# Patient Record
Sex: FEMALE | Race: WHITE | NOT HISPANIC OR LATINO
[De-identification: names, ages, dates, MRNs, and addresses within clinical notes are randomized per-mention and may not be internally consistent; named-entity substitution may affect disease eponyms.]

---

## 2017-04-04 ENCOUNTER — APPOINTMENT (OUTPATIENT)
Dept: NEUROLOGY | Facility: CLINIC | Age: 42
End: 2017-04-04

## 2017-04-04 VITALS
HEART RATE: 66 BPM | HEIGHT: 67 IN | WEIGHT: 150 LBS | DIASTOLIC BLOOD PRESSURE: 60 MMHG | SYSTOLIC BLOOD PRESSURE: 95 MMHG | BODY MASS INDEX: 23.54 KG/M2

## 2018-01-05 ENCOUNTER — MEDICATION RENEWAL (OUTPATIENT)
Age: 43
End: 2018-01-05

## 2018-04-04 ENCOUNTER — APPOINTMENT (OUTPATIENT)
Dept: NEUROLOGY | Facility: CLINIC | Age: 43
End: 2018-04-04

## 2018-05-09 ENCOUNTER — APPOINTMENT (OUTPATIENT)
Dept: NEUROLOGY | Facility: CLINIC | Age: 43
End: 2018-05-09
Payer: COMMERCIAL

## 2018-05-09 VITALS
BODY MASS INDEX: 23.86 KG/M2 | HEART RATE: 82 BPM | HEIGHT: 67 IN | WEIGHT: 152 LBS | DIASTOLIC BLOOD PRESSURE: 72 MMHG | SYSTOLIC BLOOD PRESSURE: 107 MMHG

## 2018-05-09 PROCEDURE — 99214 OFFICE O/P EST MOD 30 MIN: CPT

## 2018-05-24 ENCOUNTER — MEDICATION RENEWAL (OUTPATIENT)
Age: 43
End: 2018-05-24

## 2019-05-15 ENCOUNTER — APPOINTMENT (OUTPATIENT)
Dept: NEUROLOGY | Facility: CLINIC | Age: 44
End: 2019-05-15
Payer: COMMERCIAL

## 2019-05-15 VITALS
DIASTOLIC BLOOD PRESSURE: 64 MMHG | HEIGHT: 67 IN | HEART RATE: 73 BPM | WEIGHT: 155 LBS | BODY MASS INDEX: 24.33 KG/M2 | SYSTOLIC BLOOD PRESSURE: 94 MMHG

## 2019-05-15 DIAGNOSIS — R63.5 ABNORMAL WEIGHT GAIN: ICD-10-CM

## 2019-05-15 DIAGNOSIS — G47.52 REM SLEEP BEHAVIOR DISORDER: ICD-10-CM

## 2019-05-15 PROCEDURE — 99214 OFFICE O/P EST MOD 30 MIN: CPT

## 2019-05-15 NOTE — PHYSICAL EXAM
[General Appearance - Alert] : alert [General Appearance - In No Acute Distress] : in no acute distress [Oriented To Time, Place, And Person] : oriented to person, place, and time [Affect] : the affect was normal [Impaired Insight] : insight and judgment were intact [Person] : oriented to person [Place] : oriented to place [Time] : oriented to time [Concentration Intact] : normal concentrating ability [Visual Intact] : visual attention was ~T not ~L decreased [Naming Objects] : no difficulty naming common objects [Repeating Phrases] : no difficulty repeating a phrase [Fluency] : fluency intact [Comprehension] : comprehension intact [Writing A Sentence] : no difficulty writing a sentence [Past History] : adequate knowledge of personal past history [Reading] : reading intact [Cranial Nerves Facial (VII)] : face symmetrical [Cranial Nerves Oculomotor (III)] : extraocular motion intact [Cranial Nerves Trigeminal (V)] : facial sensation intact symmetrically [Cranial Nerves Glossopharyngeal (IX)] : tongue and palate midline [Cranial Nerves Hypoglossal (XII)] : there was no tongue deviation with protrusion [Cranial Nerves Vestibulocochlear (VIII)] : hearing was intact bilaterally [Cranial Nerves Accessory (XI - Cranial And Spinal)] : head turning and shoulder shrug symmetric [Motor Tone] : muscle tone was normal in all four extremities [Motor Strength] : muscle strength was normal in all four extremities [No Muscle Atrophy] : normal bulk in all four extremities [Motor Handedness Right-Handed] : the patient is right hand dominant [Sensation Tactile Decrease] : light touch was intact [Abnormal Walk] : normal gait [Balance] : balance was intact [2+] : Ankle jerk left 2+ [Sclera] : the sclera and conjunctiva were normal [PERRL With Normal Accommodation] : pupils were equal in size, round, reactive to light, with normal accommodation [Extraocular Movements] : extraocular movements were intact [Outer Ear] : the ears and nose were normal in appearance [Oropharynx] : the oropharynx was normal [Neck Cervical Mass (___cm)] : no neck mass was observed [Neck Appearance] : the appearance of the neck was normal [Jugular Venous Distention Increased] : there was no jugular-venous distention [Thyroid Nodule] : there were no palpable thyroid nodules [Thyroid Diffuse Enlargement] : the thyroid was not enlarged [Apical Impulse] : the apical impulse was normal [Heart Rate And Rhythm] : heart rate was normal and rhythm regular [Nail Clubbing] : no clubbing  or cyanosis of the fingernails [Skin Lesions] : no skin lesions [] : no rash [Past-pointing] : there was no past-pointing [Tremor] : no tremor present [Plantar Reflex Right Only] : normal on the right [Plantar Reflex Left Only] : normal on the left [FreeTextEntry4] : gregarious, very pleasant [FreeTextEntry5] : RLQ VF defect

## 2019-05-15 NOTE — REVIEW OF SYSTEMS
[Recent Weight Gain (___ Lbs)] : recent [unfilled] ~Ulb weight gain [As Noted in HPI] : as noted in HPI [Sleep Disturbances] : sleep disturbances [Anxiety] : anxiety [Negative] : Heme/Lymph
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
## 2019-05-15 NOTE — HISTORY OF PRESENT ILLNESS
[Right Handed] : right handed [Family History of Seizures] : family history of seizures [Head Trauma] : head trauma [Divalproex (Depakote)] : divalproex (Depakote) [Grand Mal Status Epilepticus] : no [Lesional] : nonlesional [] : no [ Complications] : ~T no  complications [Developmental Delay] : no developmental delay [Meningitis or Encephalitis] : no meningitis or encephalitis [Stroke] : no stroke  [FreeTextEntry3] : Age 3-4 staring spells.  Went to ER once at that time, unrevealing.\par As child, may have had intermittent staring spells a few times a year.\par Around age 15 starting getting jerks when sleep deprived, might drop objects. Some confusion and staring spells  to 2-3 min.  Happening nearly daily at one point.  One day upon arousal, had episode of frequent twitching, and drops, fell down stairs.  Saw neurologist at that time at [facility], then Dr Paco Zepeda at [facility].  Was placed on VPA for years with very good control. \par No GTCS ever. [FreeTextEntry1] : staring spells since age 3-5yo [de-identified] : hit head around age 3, pool table fell on her. GMo with GTC.  MCou with absence.

## 2019-05-15 NOTE — CONSULT LETTER
[Consult Letter:] : I had the pleasure of evaluating your patient, [unfilled]. [Dear  ___] : Dear  [unfilled], [Please see my note below.] : Please see my note below. [( Thank you for referring [unfilled] for consultation for _____ )] : Thank you for referring [unfilled] for consultation for [unfilled] [Sincerely,] : Sincerely, [Consult Closing:] : Thank you very much for allowing me to participate in the care of this patient.  If you have any questions, please do not hesitate to contact me. [DrSary  ___] : Dr. SHAW [Yosvany Griffiths MD] : Yosvany Griffiths MD [[facility]] : [facility] [Attending, Dept. of Neurology, Division of Epilepsy] : Attending, Dept. of Neurology, Division of Epilepsy [ of Neurology] :  of Neurology [FreeTextEntry2] : Yolande Lee MD

## 2019-05-15 NOTE — DATA REVIEWED
[de-identified] : Could not tolerated open MRI for VF defect (not done).\par  [de-identified] : done remotely [de-identified] : 2016 [facility] nl,

## 2019-09-10 ENCOUNTER — APPOINTMENT (OUTPATIENT)
Dept: FAMILY MEDICINE | Facility: CLINIC | Age: 44
End: 2019-09-10

## 2019-09-17 ENCOUNTER — APPOINTMENT (OUTPATIENT)
Dept: FAMILY MEDICINE | Facility: CLINIC | Age: 44
End: 2019-09-17
Payer: COMMERCIAL

## 2019-09-17 VITALS
WEIGHT: 152 LBS | SYSTOLIC BLOOD PRESSURE: 110 MMHG | DIASTOLIC BLOOD PRESSURE: 62 MMHG | HEIGHT: 67 IN | BODY MASS INDEX: 23.86 KG/M2

## 2019-09-17 DIAGNOSIS — Z82.49 FAMILY HISTORY OF ISCHEMIC HEART DISEASE AND OTHER DISEASES OF THE CIRCULATORY SYSTEM: ICD-10-CM

## 2019-09-17 DIAGNOSIS — Z83.3 FAMILY HISTORY OF DIABETES MELLITUS: ICD-10-CM

## 2019-09-17 DIAGNOSIS — Z87.898 PERSONAL HISTORY OF OTHER SPECIFIED CONDITIONS: ICD-10-CM

## 2019-09-17 DIAGNOSIS — Z86.39 PERSONAL HISTORY OF OTHER ENDOCRINE, NUTRITIONAL AND METABOLIC DISEASE: ICD-10-CM

## 2019-09-17 DIAGNOSIS — Z87.2 PERSONAL HISTORY OF DISEASES OF THE SKIN AND SUBCUTANEOUS TISSUE: ICD-10-CM

## 2019-09-17 DIAGNOSIS — Z83.438 FAMILY HISTORY OF OTHER DISORDER OF LIPOPROTEIN METABOLISM AND OTHER LIPIDEMIA: ICD-10-CM

## 2019-09-17 DIAGNOSIS — Z87.828 PERSONAL HISTORY OF OTHER (HEALED) PHYSICAL INJURY AND TRAUMA: ICD-10-CM

## 2019-09-17 PROCEDURE — 99204 OFFICE O/P NEW MOD 45 MIN: CPT

## 2019-09-19 PROBLEM — Z86.39 HISTORY OF OBESITY: Status: RESOLVED | Noted: 2019-05-15 | Resolved: 2019-09-19

## 2019-09-19 PROBLEM — Z82.49 FAMILY HISTORY OF MYOCARDIAL INFARCTION: Status: ACTIVE | Noted: 2019-09-19

## 2019-09-19 PROBLEM — Z83.3 FAMILY HISTORY OF DIABETES MELLITUS: Status: ACTIVE | Noted: 2019-09-19

## 2019-09-19 PROBLEM — Z87.828 HISTORY OF MOTOR VEHICLE ACCIDENT: Status: RESOLVED | Noted: 2019-09-19 | Resolved: 2019-09-19

## 2019-09-19 PROBLEM — Z83.438 FAMILY HISTORY OF HYPERLIPIDEMIA: Status: ACTIVE | Noted: 2019-09-19

## 2019-09-19 NOTE — PHYSICAL EXAM
[No Lymphadenopathy] : no lymphadenopathy [Supple] : supple [Pedal Pulses Present] : the pedal pulses are present [No Extremity Clubbing/Cyanosis] : no extremity clubbing/cyanosis [No Edema] : there was no peripheral edema [No Rash] : no rash [Coordination Grossly Intact] : coordination grossly intact [Normal Gait] : normal gait [Normal] : affect was normal and insight and judgment were intact

## 2019-09-19 NOTE — HISTORY OF PRESENT ILLNESS
[FreeTextEntry8] : Larissa is a 44-year old female who presents as a new patient to establish care. Would like to return to complete a full physical exam.\par HLD - labs from 5/19 shows elevated lipids panel, wants to retest when fasting.\par Leukocytosis - evaluated by heme\par Insomnia - no trouble falling asleep, trouble staying asleep.

## 2019-09-25 ENCOUNTER — APPOINTMENT (OUTPATIENT)
Dept: FAMILY MEDICINE | Facility: CLINIC | Age: 44
End: 2019-09-25
Payer: COMMERCIAL

## 2019-09-25 ENCOUNTER — NON-APPOINTMENT (OUTPATIENT)
Age: 44
End: 2019-09-25

## 2019-09-25 VITALS
HEIGHT: 67 IN | SYSTOLIC BLOOD PRESSURE: 108 MMHG | BODY MASS INDEX: 23.7 KG/M2 | DIASTOLIC BLOOD PRESSURE: 74 MMHG | WEIGHT: 151 LBS

## 2019-09-25 DIAGNOSIS — Z12.31 ENCOUNTER FOR SCREENING MAMMOGRAM FOR MALIGNANT NEOPLASM OF BREAST: ICD-10-CM

## 2019-09-25 PROCEDURE — 99396 PREV VISIT EST AGE 40-64: CPT | Mod: 25

## 2019-09-25 PROCEDURE — 36415 COLL VENOUS BLD VENIPUNCTURE: CPT

## 2019-09-25 PROCEDURE — 81003 URINALYSIS AUTO W/O SCOPE: CPT | Mod: QW

## 2019-09-25 PROCEDURE — 93000 ELECTROCARDIOGRAM COMPLETE: CPT

## 2019-09-25 NOTE — HEALTH RISK ASSESSMENT
[Good] : ~his/her~  mood as  good [Change in mental status noted] : No change in mental status noted [Language] : denies difficulty with language [Learning/Retaining New Information] : denies difficulty learning/retaining new information [Behavior] : denies difficulty with behavior [None] : None [Fully functional (using the telephone, shopping, preparing meals, housekeeping, doing laundry, using] : Fully functional and needs no help or supervision to perform IADLs (using the telephone, shopping, preparing meals, housekeeping, doing laundry, using transportation, managing medications and managing finances) [Fully functional (bathing, dressing, toileting, transferring, walking, feeding)] : Fully functional (bathing, dressing, toileting, transferring, walking, feeding) [Reports changes in vision] : Reports changes in vision [Reports changes in hearing] : Reports no changes in hearing [de-identified] : vision changes - floaters since headache [FreeTextEntry2] :

## 2019-09-25 NOTE — PHYSICAL EXAM
[Normal TMs] : both tympanic membranes were normal [No Lymphadenopathy] : no lymphadenopathy [Thyroid Normal, No Nodules] : the thyroid was normal and there were no nodules present [Supple] : supple [Pedal Pulses Present] : the pedal pulses are present [No Edema] : there was no peripheral edema [Normal] : no joint swelling and grossly normal strength and tone [No Rash] : no rash [Coordination Grossly Intact] : coordination grossly intact [Normal Gait] : normal gait [Alert and Oriented x3] : oriented to person, place, and time [Normal Affect] : the affect was normal [de-identified] : + clubbing b/l hands

## 2019-09-25 NOTE — ASSESSMENT
[FreeTextEntry1] : All urine and blood specimens collected in office today.\par RTO in 1 week to review labs and for PNA vaccine.\par \par Will return for PNA vaccine, does not want flu vaccine.

## 2019-09-25 NOTE — HISTORY OF PRESENT ILLNESS
[FreeTextEntry1] : patient is here for a physical.  [de-identified] : Larissa is a 44-year old female who presents for s a physical exam.\par Reports that she had a headache last week - but is not resolved, was seen by an opthalmology who recommended follow up with neuro and MRI. Denies and residual pain at this time.\par Reports left shoulder pain - ongoing, wants referral to orthopedic surgeon.\par Has already reduced smoking in half - smokes about 10 cig/day.\par

## 2019-09-26 LAB
ALBUMIN SERPL ELPH-MCNC: 4.3 G/DL
ALP BLD-CCNC: 83 U/L
ALT SERPL-CCNC: 10 U/L
ANION GAP SERPL CALC-SCNC: 13 MMOL/L
AST SERPL-CCNC: 17 U/L
BASOPHILS # BLD AUTO: 0.04 K/UL
BASOPHILS NFR BLD AUTO: 0.5 %
BILIRUB SERPL-MCNC: 0.3 MG/DL
BUN SERPL-MCNC: 6 MG/DL
CALCIUM SERPL-MCNC: 9.7 MG/DL
CHLORIDE SERPL-SCNC: 98 MMOL/L
CHOLEST SERPL-MCNC: 265 MG/DL
CHOLEST/HDLC SERPL: 8 RATIO
CO2 SERPL-SCNC: 26 MMOL/L
CREAT SERPL-MCNC: 0.65 MG/DL
EOSINOPHIL # BLD AUTO: 0.23 K/UL
EOSINOPHIL NFR BLD AUTO: 2.7 %
GLUCOSE SERPL-MCNC: 93 MG/DL
HCT VFR BLD CALC: 40.6 %
HDLC SERPL-MCNC: 33 MG/DL
HGB BLD-MCNC: 13.1 G/DL
IMM GRANULOCYTES NFR BLD AUTO: 0.2 %
LDLC SERPL CALC-MCNC: 177 MG/DL
LYMPHOCYTES # BLD AUTO: 4.71 K/UL
LYMPHOCYTES NFR BLD AUTO: 56.1 %
MAN DIFF?: NORMAL
MCHC RBC-ENTMCNC: 29 PG
MCHC RBC-ENTMCNC: 32.3 GM/DL
MCV RBC AUTO: 89.8 FL
MONOCYTES # BLD AUTO: 0.49 K/UL
MONOCYTES NFR BLD AUTO: 5.8 %
NEUTROPHILS # BLD AUTO: 2.91 K/UL
NEUTROPHILS NFR BLD AUTO: 34.7 %
PLATELET # BLD AUTO: 245 K/UL
POTASSIUM SERPL-SCNC: 4.1 MMOL/L
PROT SERPL-MCNC: 6.9 G/DL
RBC # BLD: 4.52 M/UL
RBC # FLD: 13.4 %
SODIUM SERPL-SCNC: 137 MMOL/L
TRIGL SERPL-MCNC: 276 MG/DL
TSH SERPL-ACNC: 1.07 UIU/ML
WBC # FLD AUTO: 8.4 K/UL

## 2019-10-02 ENCOUNTER — APPOINTMENT (OUTPATIENT)
Dept: FAMILY MEDICINE | Facility: CLINIC | Age: 44
End: 2019-10-02
Payer: COMMERCIAL

## 2019-10-02 DIAGNOSIS — Z23 ENCOUNTER FOR IMMUNIZATION: ICD-10-CM

## 2019-10-02 PROCEDURE — G0009: CPT

## 2019-10-02 PROCEDURE — 90732 PPSV23 VACC 2 YRS+ SUBQ/IM: CPT

## 2019-10-03 LAB
CHOLEST SERPL-MCNC: 267 MG/DL
CHOLEST/HDLC SERPL: 7.2 RATIO
HDLC SERPL-MCNC: 37 MG/DL
LDLC SERPL CALC-MCNC: 172 MG/DL
TRIGL SERPL-MCNC: 292 MG/DL

## 2020-01-13 ENCOUNTER — APPOINTMENT (OUTPATIENT)
Dept: FAMILY MEDICINE | Facility: CLINIC | Age: 45
End: 2020-01-13
Payer: COMMERCIAL

## 2020-01-13 ENCOUNTER — LABORATORY RESULT (OUTPATIENT)
Age: 45
End: 2020-01-13

## 2020-01-13 VITALS
OXYGEN SATURATION: 99 % | BODY MASS INDEX: 23.54 KG/M2 | SYSTOLIC BLOOD PRESSURE: 104 MMHG | TEMPERATURE: 98.2 F | HEART RATE: 70 BPM | HEIGHT: 67 IN | DIASTOLIC BLOOD PRESSURE: 70 MMHG | WEIGHT: 150 LBS

## 2020-01-13 DIAGNOSIS — R06.2 WHEEZING: ICD-10-CM

## 2020-01-13 PROCEDURE — 36415 COLL VENOUS BLD VENIPUNCTURE: CPT

## 2020-01-13 PROCEDURE — 99214 OFFICE O/P EST MOD 30 MIN: CPT | Mod: 25

## 2020-01-13 NOTE — PHYSICAL EXAM
[Normal Sclera/Conjunctiva] : normal sclera/conjunctiva [Normal Outer Ear/Nose] : the outer ears and nose were normal in appearance [No Respiratory Distress] : no respiratory distress  [No Accessory Muscle Use] : no accessory muscle use [No Edema] : there was no peripheral edema [Non-distended] : non-distended [No CVA Tenderness] : no CVA  tenderness [No Joint Swelling] : no joint swelling [No Rash] : no rash [Coordination Grossly Intact] : coordination grossly intact [Normal] : affect was normal and insight and judgment were intact [de-identified] : + clubbing

## 2020-01-13 NOTE — HISTORY OF PRESENT ILLNESS
[FreeTextEntry1] : Patient is here for a follow up and medication renewal at [facility] in [place]  [de-identified] : Larissa is a 44-year old female who presents for a follow up.\par Leukocytosis - h/o leukocytosis, needs a referral for hem/onc Dr. Méndez\par Herpes simplex - reports oral and genital lesions\par HLD - reports that she has not seen the nutritionist yet, and had has not adjusted her diet\par Wheeze - reports that she starts wheezing when she exercises; still smoking\par

## 2020-01-14 LAB
ALBUMIN SERPL ELPH-MCNC: 4.6 G/DL
ALP BLD-CCNC: 82 U/L
ALT SERPL-CCNC: 8 U/L
ANION GAP SERPL CALC-SCNC: 14 MMOL/L
AST SERPL-CCNC: 18 U/L
BASOPHILS # BLD AUTO: 0.04 K/UL
BASOPHILS NFR BLD AUTO: 0.4 %
BILIRUB SERPL-MCNC: 0.2 MG/DL
BUN SERPL-MCNC: 7 MG/DL
CALCIUM SERPL-MCNC: 9.1 MG/DL
CHLORIDE SERPL-SCNC: 100 MMOL/L
CHOLEST SERPL-MCNC: 229 MG/DL
CHOLEST/HDLC SERPL: 7.6 RATIO
CO2 SERPL-SCNC: 23 MMOL/L
CREAT SERPL-MCNC: 0.66 MG/DL
EOSINOPHIL # BLD AUTO: 0.26 K/UL
EOSINOPHIL NFR BLD AUTO: 2.5 %
GLUCOSE SERPL-MCNC: 86 MG/DL
HCT VFR BLD CALC: 41.6 %
HDLC SERPL-MCNC: 30 MG/DL
HGB BLD-MCNC: 13.1 G/DL
IMM GRANULOCYTES NFR BLD AUTO: 0.6 %
LDLC SERPL CALC-MCNC: 139 MG/DL
LYMPHOCYTES # BLD AUTO: 5.16 K/UL
LYMPHOCYTES NFR BLD AUTO: 48.9 %
MAN DIFF?: NORMAL
MCHC RBC-ENTMCNC: 28.7 PG
MCHC RBC-ENTMCNC: 31.5 GM/DL
MCV RBC AUTO: 91.2 FL
MONOCYTES # BLD AUTO: 0.6 K/UL
MONOCYTES NFR BLD AUTO: 5.7 %
NEUTROPHILS # BLD AUTO: 4.43 K/UL
NEUTROPHILS NFR BLD AUTO: 41.9 %
PLATELET # BLD AUTO: 339 K/UL
POTASSIUM SERPL-SCNC: 4.5 MMOL/L
PROT SERPL-MCNC: 7.3 G/DL
RBC # BLD: 4.56 M/UL
RBC # FLD: 13.3 %
SODIUM SERPL-SCNC: 137 MMOL/L
TRIGL SERPL-MCNC: 301 MG/DL
WBC # FLD AUTO: 10.55 K/UL

## 2020-03-13 ENCOUNTER — APPOINTMENT (OUTPATIENT)
Dept: PULMONOLOGY | Facility: CLINIC | Age: 45
End: 2020-03-13

## 2020-04-29 ENCOUNTER — APPOINTMENT (OUTPATIENT)
Dept: NEUROLOGY | Facility: CLINIC | Age: 45
End: 2020-04-29
Payer: COMMERCIAL

## 2020-04-29 ENCOUNTER — RX RENEWAL (OUTPATIENT)
Age: 45
End: 2020-04-29

## 2020-04-29 DIAGNOSIS — G47.9 SLEEP DISORDER, UNSPECIFIED: ICD-10-CM

## 2020-04-29 DIAGNOSIS — G47.00 INSOMNIA, UNSPECIFIED: ICD-10-CM

## 2020-04-29 PROCEDURE — 99213 OFFICE O/P EST LOW 20 MIN: CPT | Mod: 95
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
## 2020-04-29 NOTE — DATA REVIEWED
[de-identified] : Could not tolerated open MRI for VF defect (not done).\par  [de-identified] : done remotely [de-identified] : 2016 [facility] nl,

## 2020-04-29 NOTE — CONSULT LETTER
[Dear  ___] : Dear  [unfilled], [Consult Letter:] : I had the pleasure of evaluating your patient, [unfilled]. [( Thank you for referring [unfilled] for consultation for _____ )] : Thank you for referring [unfilled] for consultation for [unfilled] [Sincerely,] : Sincerely, [Please see my note below.] : Please see my note below. [Consult Closing:] : Thank you very much for allowing me to participate in the care of this patient.  If you have any questions, please do not hesitate to contact me. [DrSary  ___] : Dr. SHAW [Attending, Dept. of Neurology, Division of Epilepsy] : Attending, Dept. of Neurology, Division of Epilepsy [Yosvany Griffiths MD] : Yosvany Griffiths MD [[facility]] : [facility] [ of Neurology] :  of Neurology [FreeTextEntry2] : Yolande Lee MD

## 2020-04-29 NOTE — PHYSICAL EXAM
[FreeTextEntry1] : Exam limited via telehealth:\par MS:  awake, alert, coherent, fluent, comprehension intake, affect stable.  oriented\par CN: EOMI, face symmetrical, grimace, smile symmetrical, eye closure symmetrical, hearing intact grossly.\par Motor: moving all 4 extremities freely.\par Coord: FFM and screen to nose intact symmetrically.\par Walking / Sensation deferred.\par Cardiac/pulmonary/extremity exam deferred via telehealth.\par

## 2020-04-29 NOTE — HISTORY OF PRESENT ILLNESS
[Right Handed] : right handed [Family History of Seizures] : family history of seizures [Head Trauma] : head trauma [Divalproex (Depakote)] : divalproex (Depakote) [Lesional] : nonlesional [] : no [Grand Mal Status Epilepticus] : no [ Complications] : ~T no  complications [Developmental Delay] : no developmental delay [Meningitis or Encephalitis] : no meningitis or encephalitis [Stroke] : no stroke  [FreeTextEntry1] : staring spells since age 3-5yo [FreeTextEntry3] : Age 3-4 staring spells.  Went to ER once at that time, unrevealing.\par As child, may have had intermittent staring spells a few times a year.\par Around age 15 starting getting jerks when sleep deprived, might drop objects. Some confusion and staring spells  to 2-3 min.  Happening nearly daily at one point.  One day upon arousal, had episode of frequent twitching, and drops, fell down stairs.  Saw neurologist at that time at [facility], then Dr Paco Zepeda at [facility].  Was placed on VPA for years with very good control. \par No GTCS ever. [de-identified] : hit head around age 3, pool table fell on her. GMo with GTC.  MCou with absence.

## 2020-05-18 ENCOUNTER — APPOINTMENT (OUTPATIENT)
Dept: PULMONOLOGY | Facility: CLINIC | Age: 45
End: 2020-05-18

## 2020-05-19 ENCOUNTER — APPOINTMENT (OUTPATIENT)
Dept: FAMILY MEDICINE | Facility: CLINIC | Age: 45
End: 2020-05-19
Payer: COMMERCIAL

## 2020-05-19 VITALS
HEART RATE: 72 BPM | DIASTOLIC BLOOD PRESSURE: 76 MMHG | WEIGHT: 155.38 LBS | TEMPERATURE: 97.9 F | OXYGEN SATURATION: 98 % | HEIGHT: 66.5 IN | SYSTOLIC BLOOD PRESSURE: 124 MMHG | BODY MASS INDEX: 24.68 KG/M2

## 2020-05-19 DIAGNOSIS — B00.9 HERPESVIRAL INFECTION, UNSPECIFIED: ICD-10-CM

## 2020-05-19 PROCEDURE — 99204 OFFICE O/P NEW MOD 45 MIN: CPT

## 2020-05-19 NOTE — HISTORY OF PRESENT ILLNESS
[FreeTextEntry1] : establish care, mouth sores [de-identified] : 44 year old female with pmhx of epilepsy, anxiety, insomnia, HSV presents for increased HSV outbreaks. For past 6 months has been getting sores and outbreaks daily. Also suffers from poor dentition and believes she may have gum disease. She states she gets outbreaks in her throat, sinuses and nasal cavity. States if she misses dose of valtrex the sores return immediately but when on medication they subside. Does admit to increased stress recently due to pandemic but states sx started in January prior to it.

## 2020-05-19 NOTE — PHYSICAL EXAM
[Well-Appearing] : well-appearing [No Acute Distress] : no acute distress [Normal Sclera/Conjunctiva] : normal sclera/conjunctiva [PERRL] : pupils equal round and reactive to light [Normal Outer Ear/Nose] : the outer ears and nose were normal in appearance [No Respiratory Distress] : no respiratory distress  [No Accessory Muscle Use] : no accessory muscle use [Clear to Auscultation] : lungs were clear to auscultation bilaterally [Normal Rate] : normal rate  [Regular Rhythm] : with a regular rhythm [Normal S1, S2] : normal S1 and S2 [Soft] : abdomen soft [Non Tender] : non-tender [Non-distended] : non-distended [Normal Bowel Sounds] : normal bowel sounds [No Focal Deficits] : no focal deficits [Normal Affect] : the affect was normal [Normal Insight/Judgement] : insight and judgment were intact [de-identified] : + lesions on posterior oropharynx noted

## 2020-05-19 NOTE — REVIEW OF SYSTEMS
[Postnasal Drip] : postnasal drip [Fever] : no fever [Chills] : no chills [Fatigue] : no fatigue [Discharge] : no discharge [Vision Problems] : no vision problems [Earache] : no earache [Nasal Discharge] : no nasal discharge [Sore Throat] : no sore throat [Chest Pain] : no chest pain [Palpitations] : no palpitations [Shortness Of Breath] : no shortness of breath [Wheezing] : no wheezing [Cough] : no cough [Abdominal Pain] : no abdominal pain [Nausea] : no nausea [Diarrhea] : diarrhea [Vomiting] : no vomiting [Headache] : no headache [Dizziness] : no dizziness [Anxiety] : no anxiety [Depression] : no depression [FreeTextEntry4] : + throat and nasal sores [de-identified] : +

## 2020-05-19 NOTE — PLAN
[FreeTextEntry1] : HLD\par - mildly elevated\par - pt has tried crestor in past which caused itchiness\par - encouraged lifestyle modifications \par \par Nicotine abuse\par - counseled on importance of cessation\par \par HSV infx\par - start valtrex daily\par - f/u with ENT and periodontist \par \par Epilepsy\par - cont neuro f/u and management \par \par Elevated TG\par - cont to monitor\par - lifestyle modifications \par \par f/u in 6 months or PRN

## 2020-06-04 ENCOUNTER — APPOINTMENT (OUTPATIENT)
Dept: OTOLARYNGOLOGY | Facility: CLINIC | Age: 45
End: 2020-06-04

## 2020-07-20 ENCOUNTER — APPOINTMENT (OUTPATIENT)
Dept: FAMILY MEDICINE | Facility: CLINIC | Age: 45
End: 2020-07-20

## 2020-08-03 ENCOUNTER — APPOINTMENT (OUTPATIENT)
Dept: ULTRASOUND IMAGING | Facility: CLINIC | Age: 45
End: 2020-08-03

## 2020-08-03 ENCOUNTER — APPOINTMENT (OUTPATIENT)
Dept: MAMMOGRAPHY | Facility: CLINIC | Age: 45
End: 2020-08-03

## 2020-10-10 ENCOUNTER — APPOINTMENT (OUTPATIENT)
Dept: FAMILY MEDICINE | Facility: CLINIC | Age: 45
End: 2020-10-10
Payer: COMMERCIAL

## 2020-10-10 VITALS
TEMPERATURE: 98 F | DIASTOLIC BLOOD PRESSURE: 70 MMHG | HEART RATE: 101 BPM | WEIGHT: 150.5 LBS | SYSTOLIC BLOOD PRESSURE: 118 MMHG | BODY MASS INDEX: 24.19 KG/M2 | OXYGEN SATURATION: 99 % | HEIGHT: 66 IN

## 2020-10-10 DIAGNOSIS — Z12.39 ENCOUNTER FOR OTHER SCREENING FOR MALIGNANT NEOPLASM OF BREAST: ICD-10-CM

## 2020-10-10 DIAGNOSIS — D72.829 ELEVATED WHITE BLOOD CELL COUNT, UNSPECIFIED: ICD-10-CM

## 2020-10-10 PROCEDURE — 99396 PREV VISIT EST AGE 40-64: CPT | Mod: 25

## 2020-10-10 PROCEDURE — 99214 OFFICE O/P EST MOD 30 MIN: CPT | Mod: 25

## 2020-10-10 PROCEDURE — 96127 BRIEF EMOTIONAL/BEHAV ASSMT: CPT

## 2020-10-10 NOTE — PHYSICAL EXAM
[No Acute Distress] : no acute distress [Well-Appearing] : well-appearing [Normal Sclera/Conjunctiva] : normal sclera/conjunctiva [PERRL] : pupils equal round and reactive to light [Normal Outer Ear/Nose] : the outer ears and nose were normal in appearance [Normal TMs] : both tympanic membranes were normal [No Respiratory Distress] : no respiratory distress  [No Accessory Muscle Use] : no accessory muscle use [Clear to Auscultation] : lungs were clear to auscultation bilaterally [Normal Rate] : normal rate  [Regular Rhythm] : with a regular rhythm [Soft] : abdomen soft [Non Tender] : non-tender [Non-distended] : non-distended [Normal Bowel Sounds] : normal bowel sounds [No Joint Swelling] : no joint swelling [Grossly Normal Strength/Tone] : grossly normal strength/tone [No Rash] : no rash [No Focal Deficits] : no focal deficits [Normal Gait] : normal gait [Normal Affect] : the affect was normal [Normal Insight/Judgement] : insight and judgment were intact [de-identified] : CN II-XII grossly intact

## 2020-10-10 NOTE — REVIEW OF SYSTEMS
[Fever] : no fever [Chills] : no chills [Fatigue] : no fatigue [Discharge] : no discharge [Vision Problems] : no vision problems [Earache] : no earache [Nasal Discharge] : no nasal discharge [Sore Throat] : no sore throat [Chest Pain] : no chest pain [Palpitations] : no palpitations [Shortness Of Breath] : no shortness of breath [Wheezing] : no wheezing [Cough] : no cough [Abdominal Pain] : no abdominal pain [Nausea] : no nausea [Diarrhea] : diarrhea [Vomiting] : no vomiting [Dysuria] : no dysuria [Hematuria] : no hematuria [Joint Pain] : joint pain [Joint Stiffness] : joint stiffness [Muscle Pain] : muscle pain [Back Pain] : back pain [Itching] : no itching [Skin Rash] : no skin rash [Headache] : no headache [Dizziness] : no dizziness [Anxiety] : anxiety [Depression] : depression

## 2020-10-10 NOTE — HISTORY OF PRESENT ILLNESS
[FreeTextEntry1] : CPE, concerns of numbness and tingling. left shoulder pain [de-identified] : 45 year old female presents for CPE. She is extremely anxious and concerned about some numbness, tingling and burning she is experiencing. States it started a few weeks ago when she was sitting on the floor. Felt pulsating in her leg. Now has numbness and tingling in both feet, both hands and in her head. \par She admits to smoking and states she cannot quit now. \par She also complains of left shoulder pain. Denies known precipitating events.\par  No recent mammo. Went to [facility] in July and they had to reschedule her so she was annoyed and did not f/u again.\par  She has a hx of epilepsy and is followed by neurology

## 2020-10-10 NOTE — PLAN
[FreeTextEntry1] : Paresthesias \par - f/u labs\par - f/u with neuro \par - trial of meloxicam, can consider gabapentin but would like neuro eval first\par \par Left shoulder pain, joint stiffness\par - f/u labs\par - consider ortho eval\par - meloxicam\par - would benefit from PT\par \par epilepsy\par - neuro f/u\par \par anxiety/depression\par - cont current meds\par - worse now due to her current health issues \par \par HCM\par - rx for mammo\par - declines flu vaccine today

## 2020-10-12 LAB
25(OH)D3 SERPL-MCNC: 30.6 NG/ML
ALBUMIN SERPL ELPH-MCNC: 4.6 G/DL
ALP BLD-CCNC: 77 U/L
ALT SERPL-CCNC: 10 U/L
ANION GAP SERPL CALC-SCNC: 15 MMOL/L
AST SERPL-CCNC: 21 U/L
BASOPHILS # BLD AUTO: 0.05 K/UL
BASOPHILS NFR BLD AUTO: 0.6 %
BILIRUB SERPL-MCNC: 0.3 MG/DL
BUN SERPL-MCNC: 6 MG/DL
CALCIUM SERPL-MCNC: 9.8 MG/DL
CHLORIDE SERPL-SCNC: 100 MMOL/L
CHOLEST SERPL-MCNC: 253 MG/DL
CHOLEST/HDLC SERPL: 7.2 RATIO
CK SERPL-CCNC: 60 U/L
CO2 SERPL-SCNC: 22 MMOL/L
CREAT SERPL-MCNC: 0.7 MG/DL
CRP SERPL-MCNC: 0.17 MG/DL
EOSINOPHIL # BLD AUTO: 0.2 K/UL
EOSINOPHIL NFR BLD AUTO: 2.3 %
ERYTHROCYTE [SEDIMENTATION RATE] IN BLOOD BY WESTERGREN METHOD: 13 MM/HR
ESTIMATED AVERAGE GLUCOSE: 114 MG/DL
FOLATE SERPL-MCNC: 9 NG/ML
GLUCOSE SERPL-MCNC: 99 MG/DL
HBA1C MFR BLD HPLC: 5.6 %
HCT VFR BLD CALC: 42.6 %
HDLC SERPL-MCNC: 35 MG/DL
HGB BLD-MCNC: 13.9 G/DL
IMM GRANULOCYTES NFR BLD AUTO: 0.2 %
LDLC SERPL CALC-MCNC: 166 MG/DL
LYMPHOCYTES # BLD AUTO: 4.51 K/UL
LYMPHOCYTES NFR BLD AUTO: 51.3 %
MAN DIFF?: NORMAL
MCHC RBC-ENTMCNC: 29 PG
MCHC RBC-ENTMCNC: 32.6 GM/DL
MCV RBC AUTO: 88.8 FL
MONOCYTES # BLD AUTO: 0.54 K/UL
MONOCYTES NFR BLD AUTO: 6.1 %
NEUTROPHILS # BLD AUTO: 3.47 K/UL
NEUTROPHILS NFR BLD AUTO: 39.5 %
PLATELET # BLD AUTO: 236 K/UL
POTASSIUM SERPL-SCNC: 4.3 MMOL/L
PROT SERPL-MCNC: 7.1 G/DL
RBC # BLD: 4.8 M/UL
RBC # FLD: 13 %
RHEUMATOID FACT SER QL: <10 IU/ML
SODIUM SERPL-SCNC: 136 MMOL/L
TRIGL SERPL-MCNC: 259 MG/DL
TSH SERPL-ACNC: 1.04 UIU/ML
VIT B12 SERPL-MCNC: 703 PG/ML
WBC # FLD AUTO: 8.79 K/UL

## 2020-10-13 LAB
CCP AB SER IA-ACNC: <8 UNITS
DSDNA AB SER-ACNC: <12 IU/ML
ENA SCL70 IGG SER IA-ACNC: <0.2 AL
ENA SS-A AB SER IA-ACNC: <0.2 AL
ENA SS-B AB SER IA-ACNC: <0.2 AL
RF+CCP IGG SER-IMP: NEGATIVE

## 2020-10-19 LAB
ANA SER IF-ACNC: NEGATIVE
ANAPLASMA PHAGOCYTO IGM COMENT: NORMAL
ANAPLASMA PHAGOCYTO IGM COMMENT: NORMAL
ANAPLASMA PHAGOCYTOPHILIA IGG ANTIBODIES: NORMAL
ANAPLASMA PHAGOCYTOPHILIA IGM ANTIBODIES: NORMAL
B BURGDOR AB SER-IMP: NEGATIVE
B BURGDOR IGG+IGM SER QL: 0.1 INDEX
B MICROTI AB SER QL: NORMAL
BABESIA ANTIBODIES, IGG: NORMAL
BABESIA ANTIBODIES, IGM: NORMAL
EHRLICIA CHAFFEENIS IGG ANTIBODIES: NORMAL
EHRLICIA CHAFFEENIS IGG COMMENT: NORMAL
EHRLICIA CHAFFEENIS IGG INTERP: NORMAL
EHRLICIA CHAFFEENIS IGM ANTIBODIES: NORMAL

## 2021-01-06 ENCOUNTER — APPOINTMENT (OUTPATIENT)
Dept: FAMILY MEDICINE | Facility: CLINIC | Age: 46
End: 2021-01-06
Payer: COMMERCIAL

## 2021-01-06 DIAGNOSIS — R19.7 DIARRHEA, UNSPECIFIED: ICD-10-CM

## 2021-01-06 DIAGNOSIS — F17.200 NICOTINE DEPENDENCE, UNSPECIFIED, UNCOMPLICATED: ICD-10-CM

## 2021-01-06 DIAGNOSIS — Z20.822 CONTACT WITH AND (SUSPECTED) EXPOSURE TO COVID-19: ICD-10-CM

## 2021-01-06 PROCEDURE — 99213 OFFICE O/P EST LOW 20 MIN: CPT | Mod: 95

## 2021-01-06 NOTE — HISTORY OF PRESENT ILLNESS
[Home] : at home, [unfilled] , at the time of the visit. [Medical Office: ([facility])___] : at the medical office located in  [Verbal consent obtained from patient] : the patient, [unfilled] [FreeTextEntry8] : Family all ill with COVID from [place] dinner. Parents and brother diagnosed yesterday.\par She has not been tested yet but became ill last night.  Chest tight and profound diaphoresis. Then diarrhea and vomiting for several hours. She took 2 Pepcid and felt a bit better.\par Able to hydrate with tea, Gatorade and has had saltines.  \par She is very worried as she is a smoker since age 17. Smokes a pack a day. History of asthma and does not have inhaler. \par Her parents are elderly with medical complications. She is very worried about what to do.

## 2021-01-06 NOTE — PLAN
[FreeTextEntry1] : Continue with fluids, rest.\par Advised patient to have her parents contact their PCP and have telehealth for treatment, management. \par Patient to FU as needed.

## 2021-01-08 ENCOUNTER — NON-APPOINTMENT (OUTPATIENT)
Age: 46
End: 2021-01-08

## 2021-01-08 ENCOUNTER — TRANSCRIPTION ENCOUNTER (OUTPATIENT)
Age: 46
End: 2021-01-08

## 2021-05-04 ENCOUNTER — APPOINTMENT (OUTPATIENT)
Dept: NEUROLOGY | Facility: CLINIC | Age: 46
End: 2021-05-04
Payer: COMMERCIAL

## 2021-05-04 DIAGNOSIS — M25.512 PAIN IN LEFT SHOULDER: ICD-10-CM

## 2021-05-04 PROCEDURE — 99213 OFFICE O/P EST LOW 20 MIN: CPT | Mod: 95

## 2021-05-04 NOTE — HISTORY OF PRESENT ILLNESS
[Right Handed] : right handed [Family History of Seizures] : family history of seizures [Head Trauma] : head trauma [Divalproex (Depakote)] : divalproex (Depakote) [Grand Mal Status Epilepticus] : no [] : no [Lesional] : nonlesional [ Complications] : ~T no  complications [Meningitis or Encephalitis] : no meningitis or encephalitis [Developmental Delay] : no developmental delay [Stroke] : no stroke  [FreeTextEntry1] : staring spells since age 3-3yo [FreeTextEntry3] : Age 3-4 staring spells.  Went to ER once at that time, unrevealing.\par As child, may have had intermittent staring spells a few times a year.\par Around age 15 starting getting jerks when sleep deprived, might drop objects. Some confusion and staring spells  to 2-3 min.  Happening nearly daily at one point.  One day upon arousal, had episode of frequent twitching, and drops, fell down stairs.  Saw neurologist at that time at [facility], then Dr Paco Zepeda at [facility].  Was placed on VPA for years with very good control. \par No GTCS ever. [de-identified] : hit head around age 3, pool table fell on her. GMo with GTC.  MCou with absence.
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
## 2021-05-04 NOTE — DATA REVIEWED
[de-identified] : Could not tolerated open MRI for VF defect (not done).\par  [de-identified] : done remotely [de-identified] : [address] [facility] nl,

## 2021-05-04 NOTE — CONSULT LETTER
[Dear  ___] : Dear  [unfilled], [Consult Letter:] : I had the pleasure of evaluating your patient, [unfilled]. [( Thank you for referring [unfilled] for consultation for _____ )] : Thank you for referring [unfilled] for consultation for [unfilled] [Please see my note below.] : Please see my note below. [Consult Closing:] : Thank you very much for allowing me to participate in the care of this patient.  If you have any questions, please do not hesitate to contact me. [Sincerely,] : Sincerely, [DrSary  ___] : Dr. SHAW [Yosvany Griffiths MD] : Yosvany Griffiths MD [Attending, Dept. of Neurology, Division of Epilepsy] : Attending, Dept. of Neurology, Division of Epilepsy [[facility]] : [facility] [ of Neurology] :  of Neurology [FreeTextEntry2] : Yolande Lee MD

## 2021-06-23 ENCOUNTER — APPOINTMENT (OUTPATIENT)
Dept: NEUROLOGY | Facility: CLINIC | Age: 46
End: 2021-06-23
Payer: COMMERCIAL

## 2021-06-23 PROCEDURE — 99442: CPT

## 2021-06-28 ENCOUNTER — NON-APPOINTMENT (OUTPATIENT)
Age: 46
End: 2021-06-28

## 2021-07-01 ENCOUNTER — NON-APPOINTMENT (OUTPATIENT)
Age: 46
End: 2021-07-01

## 2021-07-01 ENCOUNTER — APPOINTMENT (OUTPATIENT)
Dept: OTOLARYNGOLOGY | Facility: CLINIC | Age: 46
End: 2021-07-01
Payer: COMMERCIAL

## 2021-07-01 VITALS
TEMPERATURE: 97.3 F | WEIGHT: 145 LBS | BODY MASS INDEX: 23.3 KG/M2 | DIASTOLIC BLOOD PRESSURE: 64 MMHG | SYSTOLIC BLOOD PRESSURE: 102 MMHG | HEART RATE: 73 BPM | HEIGHT: 66 IN

## 2021-07-01 DIAGNOSIS — K21.9 GASTRO-ESOPHAGEAL REFLUX DISEASE W/OUT ESOPHAGITIS: ICD-10-CM

## 2021-07-01 DIAGNOSIS — J32.0 CHRONIC MAXILLARY SINUSITIS: ICD-10-CM

## 2021-07-01 PROCEDURE — 99204 OFFICE O/P NEW MOD 45 MIN: CPT | Mod: 25

## 2021-07-01 PROCEDURE — 31231 NASAL ENDOSCOPY DX: CPT

## 2021-07-01 RX ORDER — ALUMINUM HYDROXIDE AND MAGNESIUM TRISILICATE 80; 14.2 MG/1; MG/1
TABLET, CHEWABLE ORAL
Refills: 0 | Status: ACTIVE | COMMUNITY

## 2021-07-01 NOTE — ASSESSMENT
[FreeTextEntry1] : Patient with c/o mucous in throat as well as headaches  - but also notes burning in throat and c/o feeling of acid in throat.  No evidence of sinusitis however findings consistent with reflux.  Recommended reflux diet and omeprazole before breakfast and famotidine before dinner.  \par Patient also has headache issues and feel this is likely migraine or other headache issue.  Recommended neurology eval.  \par Follow up in 2-3 mos and as necessary \par \par \par \par \par \par \par \par

## 2021-07-01 NOTE — HISTORY OF PRESENT ILLNESS
[de-identified] : c/o mucous in throat and throat clearing and PND -  problem worse past 2-3 weeks.  Also noted throat discomfort and feeling of acid in throat   Does feel like something stuck in throat and feeling of pressure in face.  Occ nasal congestion.   Occ feels regurg.  Also note light sensitivity.  Occ feels SOB.  No fever.  Attempted self treatment for pantoprazole with some relief. Worse with coffee.  Does snack.  Hx of NSR 2002

## 2021-07-01 NOTE — REVIEW OF SYSTEMS
[Post Nasal Drip] : post nasal drip [Nasal Congestion] : nasal congestion [Sinus Pain] : sinus pain [Sinus Pressure] : sinus pressure [Throat Clearing] : throat clearing [Throat Pain] : throat pain [Throat Dryness] : throat dryness [Throat Itching] : throat itching [Eye Pain] : eye pain [Dry Eyes] : dry eyes [Eyes Itch] : itching of the eyes [Shortness Of Breath] : shortness of breath [Wheezing] : wheezing [Cough] : cough [Negative] : Heme/Lymph [de-identified] : dry nose and mouth

## 2021-07-01 NOTE — REASON FOR VISIT
[Initial Consultation] : an initial consultation for [Sinusitis] : sinusitis [Parent] : parent [Other: _____] : [unfilled] [FreeTextEntry2] : excess of mucus in her throat and post nasal drip and headaches

## 2021-07-12 ENCOUNTER — APPOINTMENT (OUTPATIENT)
Dept: NEUROLOGY | Facility: CLINIC | Age: 46
End: 2021-07-12
Payer: COMMERCIAL

## 2021-07-12 PROCEDURE — 99213 OFFICE O/P EST LOW 20 MIN: CPT | Mod: 95

## 2021-07-12 RX ORDER — TOPIRAMATE 25 MG/1
25 TABLET, FILM COATED ORAL TWICE DAILY
Qty: 60 | Refills: 5 | Status: DISCONTINUED | COMMUNITY
Start: 2021-06-23 | End: 2021-07-12

## 2021-07-12 NOTE — DATA REVIEWED
[de-identified] : 6/2021 Apparent small area of volume loss/possible encephalomalacia in the left\par occipital lobe. Small WM change in right frontal region. [de-identified] : done remotely [de-identified] : 2016 [facility] nl,
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
## 2021-07-12 NOTE — HISTORY OF PRESENT ILLNESS
[Grand Mal Status Epilepticus] : no [] : no [Lesional] : nonlesional [ Complications] : ~T no  complications [Meningitis or Encephalitis] : no meningitis or encephalitis [Developmental Delay] : no developmental delay [Stroke] : no stroke  [FreeTextEntry1] : staring spells since age 3-5yo [FreeTextEntry3] : Age 3-4 staring spells.  Went to ER once at that time, unrevealing.\par As child, may have had intermittent staring spells a few times a year.\par Around age 15 starting getting jerks when sleep deprived, might drop objects. Some confusion and staring spells  to 2-3 min.  Happening nearly daily at one point.  One day upon arousal, had episode of frequent twitching, and drops, fell down stairs.  Saw neurologist at that time at [facility], then Dr Paco Zepeda at [facility].  Was placed on VPA for years with very good control. \par No GTCS ever. [de-identified] : hit head around age 3, pool table fell on her. GMo with GTC.  MCou with absence.

## 2021-07-13 ENCOUNTER — TRANSCRIPTION ENCOUNTER (OUTPATIENT)
Age: 46
End: 2021-07-13

## 2021-07-13 DIAGNOSIS — M54.10 RADICULOPATHY, SITE UNSPECIFIED: ICD-10-CM

## 2021-07-14 ENCOUNTER — TRANSCRIPTION ENCOUNTER (OUTPATIENT)
Age: 46
End: 2021-07-14

## 2021-07-15 ENCOUNTER — TRANSCRIPTION ENCOUNTER (OUTPATIENT)
Age: 46
End: 2021-07-15

## 2021-07-27 ENCOUNTER — TRANSCRIPTION ENCOUNTER (OUTPATIENT)
Age: 46
End: 2021-07-27

## 2021-08-03 NOTE — HISTORY OF PRESENT ILLNESS
[FreeTextEntry1] : **UPDATE:6/23/2021***\par Appointment was conducted by telephonic at request of patient in place of a follow up office visit due to heightened concern for Corona virus infection risk.\par Verbal consent given on Aug 03, 2021 1:12 PM by the patient Ms. ARNOLDO GARCIA May 28 1975 who understands that the telephone visit will be charged to insurance and may involve co-pay for patient\par Nurse Practitioner location:\par Patient location:\par Individuals on call: KALEE Rizzo, Ms. ARNOLDO GARCIA\par \par \par MS ARNOLDO GARCIA reports having neck and back pain

## 2021-08-03 NOTE — DISCUSSION/SUMMARY
[FreeTextEntry1] : MRI cervical//thoracic/lumbar ordered for new onset neck and back pain to r/o radiculopathy

## 2021-09-20 ENCOUNTER — RX RENEWAL (OUTPATIENT)
Age: 46
End: 2021-09-20

## 2021-10-26 ENCOUNTER — APPOINTMENT (OUTPATIENT)
Dept: OBGYN | Facility: CLINIC | Age: 46
End: 2021-10-26

## 2021-11-15 ENCOUNTER — APPOINTMENT (OUTPATIENT)
Dept: OTOLARYNGOLOGY | Facility: CLINIC | Age: 46
End: 2021-11-15

## 2021-12-17 ENCOUNTER — RX RENEWAL (OUTPATIENT)
Age: 46
End: 2021-12-17

## 2021-12-21 ENCOUNTER — RX RENEWAL (OUTPATIENT)
Age: 46
End: 2021-12-21

## 2022-01-17 ENCOUNTER — RX RENEWAL (OUTPATIENT)
Age: 47
End: 2022-01-17

## 2022-02-11 ENCOUNTER — APPOINTMENT (OUTPATIENT)
Dept: FAMILY MEDICINE | Facility: CLINIC | Age: 47
End: 2022-02-11
Payer: COMMERCIAL

## 2022-02-11 VITALS
TEMPERATURE: 98 F | DIASTOLIC BLOOD PRESSURE: 60 MMHG | BODY MASS INDEX: 24.75 KG/M2 | OXYGEN SATURATION: 97 % | WEIGHT: 154 LBS | RESPIRATION RATE: 16 BRPM | SYSTOLIC BLOOD PRESSURE: 114 MMHG | HEART RATE: 78 BPM | HEIGHT: 66 IN

## 2022-02-11 DIAGNOSIS — Z12.11 ENCOUNTER FOR SCREENING FOR MALIGNANT NEOPLASM OF COLON: ICD-10-CM

## 2022-02-11 PROCEDURE — 99396 PREV VISIT EST AGE 40-64: CPT | Mod: 25

## 2022-02-11 PROCEDURE — 99406 BEHAV CHNG SMOKING 3-10 MIN: CPT

## 2022-02-11 RX ORDER — VALACYCLOVIR 500 MG/1
500 TABLET, FILM COATED ORAL
Qty: 24 | Refills: 2 | Status: COMPLETED | COMMUNITY
Start: 2020-01-13 | End: 2022-02-11

## 2022-02-11 RX ORDER — PANTOPRAZOLE 20 MG/1
20 TABLET, DELAYED RELEASE ORAL
Refills: 0 | Status: COMPLETED | COMMUNITY
End: 2022-02-11

## 2022-02-11 RX ORDER — SIMVASTATIN 5 MG/1
5 TABLET, FILM COATED ORAL
Qty: 30 | Refills: 0 | Status: COMPLETED | COMMUNITY
Start: 2020-10-12 | End: 2022-02-11

## 2022-02-11 RX ORDER — MELOXICAM 15 MG/1
15 TABLET ORAL DAILY
Qty: 1 | Refills: 0 | Status: COMPLETED | COMMUNITY
Start: 2020-10-10 | End: 2022-02-11

## 2022-02-11 RX ORDER — FAMOTIDINE 20 MG/1
20 TABLET, FILM COATED ORAL
Qty: 90 | Refills: 3 | Status: COMPLETED | COMMUNITY
Start: 2021-07-01 | End: 2022-02-11

## 2022-02-11 RX ORDER — ALBUTEROL SULFATE 90 UG/1
108 (90 BASE) AEROSOL, METERED RESPIRATORY (INHALATION)
Qty: 1 | Refills: 1 | Status: COMPLETED | COMMUNITY
Start: 2021-01-06 | End: 2022-02-11

## 2022-02-11 RX ORDER — OMEPRAZOLE 40 MG/1
40 CAPSULE, DELAYED RELEASE ORAL
Qty: 90 | Refills: 1 | Status: COMPLETED | COMMUNITY
Start: 2021-07-01 | End: 2022-02-11

## 2022-02-11 RX ORDER — OMEPRAZOLE 40 MG/1
40 CAPSULE, DELAYED RELEASE ORAL
Qty: 30 | Refills: 2 | Status: COMPLETED | COMMUNITY
Start: 2021-10-27 | End: 2022-02-11

## 2022-02-11 RX ORDER — SERTRALINE HYDROCHLORIDE 50 MG/1
50 TABLET, FILM COATED ORAL
Refills: 0 | Status: ACTIVE | COMMUNITY
Start: 2022-02-11

## 2022-02-11 NOTE — PHYSICAL EXAM
[No Acute Distress] : no acute distress [Well-Appearing] : well-appearing [Normal Sclera/Conjunctiva] : normal sclera/conjunctiva [PERRL] : pupils equal round and reactive to light [Normal Outer Ear/Nose] : the outer ears and nose were normal in appearance [Normal TMs] : both tympanic membranes were normal [No Respiratory Distress] : no respiratory distress  [No Accessory Muscle Use] : no accessory muscle use [Clear to Auscultation] : lungs were clear to auscultation bilaterally [Normal Rate] : normal rate  [Regular Rhythm] : with a regular rhythm [No Rash] : no rash [No Focal Deficits] : no focal deficits [Normal Affect] : the affect was normal [Normal Insight/Judgement] : insight and judgment were intact

## 2022-02-11 NOTE — PLAN
[FreeTextEntry1] : HLD\par - discussed advanced cardiac IQ. Advised this is not covered by insurance but can provide useful information regarding her cholesterol and is more comprehensive then basic lipid panel\par - cardio referral\par \par HCM\par - f/u labs\par - encourage healthy diet and exercise\par - mammo ref\par - cologuard

## 2022-02-11 NOTE — HISTORY OF PRESENT ILLNESS
[FreeTextEntry1] : CPE [de-identified] : 46 year old female presents for CPE. She feels well overall. \par She has pmhx of HLD and states her father had MI at 48. She states previously she had a reaction with itching and hives when she took a statin in the past .Was taking fenofibrate but ran out of meds so has not been taking anything. \par

## 2022-02-11 NOTE — COUNSELING
[Yes] : Risk of tobacco use and health benefits of smoking cessation discussed: Yes [Cessation strategies including cessation program discussed] : Cessation strategies including cessation program discussed [Use of nicotine replacement therapies and other medications discussed] : Use of nicotine replacement therapies and other medications discussed [Encouraged to pick a quit date and identify support needed to quit] : Encouraged to pick a quit date and identify support needed to quit [FreeTextEntry1] : 6

## 2022-02-22 DIAGNOSIS — Z98.82 BREAST IMPLANT STATUS: ICD-10-CM

## 2022-02-22 DIAGNOSIS — S29.9XXA UNSPECIFIED INJURY OF THORAX, INITIAL ENCOUNTER: ICD-10-CM

## 2022-03-03 ENCOUNTER — APPOINTMENT (OUTPATIENT)
Dept: OTOLARYNGOLOGY | Facility: CLINIC | Age: 47
End: 2022-03-03

## 2022-03-21 ENCOUNTER — RX RENEWAL (OUTPATIENT)
Age: 47
End: 2022-03-21

## 2022-04-20 ENCOUNTER — APPOINTMENT (OUTPATIENT)
Dept: MAMMOGRAPHY | Facility: CLINIC | Age: 47
End: 2022-04-20
Payer: COMMERCIAL

## 2022-04-20 ENCOUNTER — APPOINTMENT (OUTPATIENT)
Dept: ULTRASOUND IMAGING | Facility: CLINIC | Age: 47
End: 2022-04-20
Payer: COMMERCIAL

## 2022-04-20 ENCOUNTER — RESULT REVIEW (OUTPATIENT)
Age: 47
End: 2022-04-20

## 2022-04-20 ENCOUNTER — OUTPATIENT (OUTPATIENT)
Dept: OUTPATIENT SERVICES | Facility: HOSPITAL | Age: 47
LOS: 1 days | End: 2022-04-20
Payer: MEDICAID

## 2022-04-20 DIAGNOSIS — Z12.39 ENCOUNTER FOR OTHER SCREENING FOR MALIGNANT NEOPLASM OF BREAST: ICD-10-CM

## 2022-04-20 PROCEDURE — 77063 BREAST TOMOSYNTHESIS BI: CPT

## 2022-04-20 PROCEDURE — 77067 SCR MAMMO BI INCL CAD: CPT

## 2022-04-20 PROCEDURE — 77063 BREAST TOMOSYNTHESIS BI: CPT | Mod: 26

## 2022-04-20 PROCEDURE — 77067 SCR MAMMO BI INCL CAD: CPT | Mod: 26

## 2022-04-20 PROCEDURE — 76641 ULTRASOUND BREAST COMPLETE: CPT | Mod: 26,50

## 2022-04-20 PROCEDURE — 76641 ULTRASOUND BREAST COMPLETE: CPT

## 2022-05-27 ENCOUNTER — APPOINTMENT (OUTPATIENT)
Dept: NEUROLOGY | Facility: CLINIC | Age: 47
End: 2022-05-27
Payer: COMMERCIAL

## 2022-05-27 VITALS — BODY MASS INDEX: 23.46 KG/M2 | HEIGHT: 66 IN | WEIGHT: 146 LBS

## 2022-05-27 DIAGNOSIS — G43.809 OTHER MIGRAINE, NOT INTRACTABLE, W/OUT STATUS MIGRAINOSUS: ICD-10-CM

## 2022-05-27 DIAGNOSIS — Z51.81 ENCOUNTER FOR THERAPEUTIC DRUG LVL MONITORING: ICD-10-CM

## 2022-05-27 PROCEDURE — 99213 OFFICE O/P EST LOW 20 MIN: CPT | Mod: 95

## 2022-05-27 NOTE — CONSULT LETTER
[Consult Letter:] : I had the pleasure of evaluating your patient, [unfilled]. [( Thank you for referring [unfilled] for consultation for _____ )] : Thank you for referring [unfilled] for consultation for [unfilled] [Please see my note below.] : Please see my note below. [Consult Closing:] : Thank you very much for allowing me to participate in the care of this patient.  If you have any questions, please do not hesitate to contact me. [Sincerely,] : Sincerely, [Yosvany Griffiths MD] : Yosvany Griffiths MD [Attending, Dept. of Neurology, Division of Epilepsy] : Attending, Dept. of Neurology, Division of Epilepsy [[facility]] : [facility] [ of Neurology] :  of Neurology [Dear  ___] : Dear  [unfilled], [FreeTextEntry2] : Tiara Miner MD
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
## 2022-05-27 NOTE — HISTORY OF PRESENT ILLNESS
[Right Handed] : right handed [Family History of Seizures] : family history of seizures [Head Trauma] : head trauma [Divalproex (Depakote)] : divalproex (Depakote) [Grand Mal Status Epilepticus] : no [] : no [Lesional] : nonlesional [ Complications] : ~T no  complications [Meningitis or Encephalitis] : no meningitis or encephalitis [Developmental Delay] : no developmental delay [Stroke] : no stroke  [FreeTextEntry1] : staring spells since age 3-3yo [FreeTextEntry3] : Age 3-4 staring spells.  Went to ER once at that time, unrevealing.\par As child, may have had intermittent staring spells a few times a year.\par Around age 15 starting getting jerks when sleep deprived, might drop objects. Some confusion and staring spells  to 2-3 min.  Happening nearly daily at one point.  One day upon arousal, had episode of frequent twitching, and drops, fell down stairs.  Saw neurologist at that time at [facility], then Dr Paco Zepeda at [facility].  Was placed on VPA for years with very good control. \par No GTCS ever. [de-identified] : hit head around age 3, pool table fell on her. GMo with GTC.  MCou with absence.

## 2022-05-27 NOTE — DATA REVIEWED
[de-identified] : 6/2021 Apparent small area of volume loss/possible encephalomalacia in the left\par occipital lobe. Small WM change in right frontal region. [de-identified] : done remotely [de-identified] : 2016 [facility] nl,

## 2022-08-16 ENCOUNTER — LABORATORY RESULT (OUTPATIENT)
Age: 47
End: 2022-08-16

## 2022-08-16 ENCOUNTER — APPOINTMENT (OUTPATIENT)
Dept: FAMILY MEDICINE | Facility: CLINIC | Age: 47
End: 2022-08-16

## 2022-08-16 VITALS
HEIGHT: 67 IN | TEMPERATURE: 97.2 F | OXYGEN SATURATION: 98 % | HEART RATE: 71 BPM | DIASTOLIC BLOOD PRESSURE: 59 MMHG | SYSTOLIC BLOOD PRESSURE: 100 MMHG | BODY MASS INDEX: 22.44 KG/M2 | WEIGHT: 143 LBS

## 2022-08-16 DIAGNOSIS — Z01.818 ENCOUNTER FOR OTHER PREPROCEDURAL EXAMINATION: ICD-10-CM

## 2022-08-16 DIAGNOSIS — Z91.89 OTHER SPECIFIED PERSONAL RISK FACTORS, NOT ELSEWHERE CLASSIFIED: ICD-10-CM

## 2022-08-16 PROCEDURE — 99214 OFFICE O/P EST MOD 30 MIN: CPT

## 2022-08-16 RX ORDER — EZETIMIBE 10 MG/1
10 TABLET ORAL
Qty: 30 | Refills: 0 | Status: COMPLETED | COMMUNITY
Start: 2020-10-26 | End: 2022-08-16

## 2022-08-16 RX ORDER — COVID-19 ANTIGEN TEST
KIT MISCELLANEOUS
Qty: 8 | Refills: 0 | Status: COMPLETED | COMMUNITY
Start: 2022-07-29

## 2022-08-18 LAB
25(OH)D3 SERPL-MCNC: 29.1 NG/ML
ALBUMIN SERPL ELPH-MCNC: 4.2 G/DL
ALP BLD-CCNC: 106 U/L
ALT SERPL-CCNC: 33 U/L
ANION GAP SERPL CALC-SCNC: 13 MMOL/L
AST SERPL-CCNC: 15 U/L
BASOPHILS # BLD AUTO: 0.08 K/UL
BASOPHILS NFR BLD AUTO: 0.8 %
BILIRUB SERPL-MCNC: 0.2 MG/DL
BUN SERPL-MCNC: 8 MG/DL
CALCIUM SERPL-MCNC: 9.6 MG/DL
CHLORIDE SERPL-SCNC: 96 MMOL/L
CO2 SERPL-SCNC: 25 MMOL/L
CREAT SERPL-MCNC: 0.77 MG/DL
EGFR: 96 ML/MIN/1.73M2
EOSINOPHIL # BLD AUTO: 0.42 K/UL
EOSINOPHIL NFR BLD AUTO: 4.1 %
ESTIMATED AVERAGE GLUCOSE: 117 MG/DL
GLUCOSE SERPL-MCNC: 76 MG/DL
HBA1C MFR BLD HPLC: 5.7 %
HCT VFR BLD CALC: 43.8 %
HGB BLD-MCNC: 14.2 G/DL
LYMPHOCYTES # BLD AUTO: 4.96 K/UL
LYMPHOCYTES NFR BLD AUTO: 48.8 %
MAN DIFF?: NORMAL
MCHC RBC-ENTMCNC: 28.6 PG
MCHC RBC-ENTMCNC: 32.4 GM/DL
MCV RBC AUTO: 88.1 FL
MONOCYTES # BLD AUTO: 0.42 K/UL
MONOCYTES NFR BLD AUTO: 4.1 %
NEUTROPHILS # BLD AUTO: 4.04 K/UL
NEUTROPHILS NFR BLD AUTO: 39.8 %
PLATELET # BLD AUTO: 243 K/UL
POTASSIUM SERPL-SCNC: 5 MMOL/L
PROT SERPL-MCNC: 6.7 G/DL
RBC # BLD: 4.97 M/UL
RBC # FLD: 13.2 %
SODIUM SERPL-SCNC: 135 MMOL/L
TSH SERPL-ACNC: 1.37 UIU/ML
WBC # FLD AUTO: 10.16 K/UL

## 2022-08-18 NOTE — HISTORY OF PRESENT ILLNESS
[No Pertinent Cardiac History] : no history of aortic stenosis, atrial fibrillation, coronary artery disease, recent myocardial infarction, or implantable device/pacemaker [Asthma] : asthma [Smoker] : smoker [(Patient denies any chest pain, claudication, dyspnea on exertion, orthopnea, palpitations or syncope)] : Patient denies any chest pain, claudication, dyspnea on exertion, orthopnea, palpitations or syncope [COPD] : no COPD [Sleep Apnea] : no sleep apnea [Chronic Anticoagulation] : no chronic anticoagulation [Chronic Kidney Disease] : no chronic kidney disease [Diabetes] : no diabetes [FreeTextEntry1] : dental implants  [FreeTextEntry2] : 8/25/22 [FreeTextEntry3] : Dr. Roger [FreeTextEntry4] : 47 year old female with pmhx of smoking, hx of drug abuse on Suboxone, myoclonic epilepsy (very controlled), HLD, exercise induced asthma presents for medical clearance for dental implants. She feels well overall. Does admit to chronic mild wheezing. No chest pain when walking.

## 2022-08-18 NOTE — ASSESSMENT
[Patient Requires Further Testing] : Patient requires further testing [FreeTextEntry4] : Moderate risk pt for low risk procedure. She was advised on smoking cessation and prescribed nicotine patches. \par She is optimized pending labs. Not a candidate for controlled substance pain medication.

## 2022-08-18 NOTE — PHYSICAL EXAM
[No Acute Distress] : no acute distress [Well-Appearing] : well-appearing [Normal Outer Ear/Nose] : the outer ears and nose were normal in appearance [Normal Sclera/Conjunctiva] : normal sclera/conjunctiva [PERRL] : pupils equal round and reactive to light [Normal TMs] : both tympanic membranes were normal [No Lymphadenopathy] : no lymphadenopathy [Supple] : supple [No Respiratory Distress] : no respiratory distress  [No Accessory Muscle Use] : no accessory muscle use [Normal Rate] : normal rate  [Regular Rhythm] : with a regular rhythm [No Rash] : no rash [No Focal Deficits] : no focal deficits [Normal Affect] : the affect was normal [Normal Insight/Judgement] : insight and judgment were intact [de-identified] : mild exp wheeze at lung bases bl

## 2022-08-18 NOTE — REVIEW OF SYSTEMS
[Wheezing] : wheezing [Fever] : no fever [Chills] : no chills [Fatigue] : no fatigue [Discharge] : no discharge [Vision Problems] : no vision problems [Earache] : no earache [Nasal Discharge] : no nasal discharge [Sore Throat] : no sore throat [Chest Pain] : no chest pain [Palpitations] : no palpitations [Shortness Of Breath] : no shortness of breath [Cough] : no cough [Abdominal Pain] : no abdominal pain [Nausea] : no nausea [Diarrhea] : diarrhea [Vomiting] : no vomiting [Headache] : no headache [Dizziness] : no dizziness [Easy Bleeding] : no easy bleeding [Easy Bruising] : no easy bruising [FreeTextEntry6] : mild wheezing

## 2022-08-18 NOTE — ADDENDUM
[FreeTextEntry1] : 8/18/22: Labs reviewed. Vit D level 29.1. Advised supplementation. No medical contraindication to planned procedure.

## 2022-11-19 ENCOUNTER — RX RENEWAL (OUTPATIENT)
Age: 47
End: 2022-11-19

## 2023-01-19 ENCOUNTER — APPOINTMENT (OUTPATIENT)
Dept: ULTRASOUND IMAGING | Facility: CLINIC | Age: 48
End: 2023-01-19
Payer: MEDICAID

## 2023-01-19 ENCOUNTER — OUTPATIENT (OUTPATIENT)
Dept: OUTPATIENT SERVICES | Facility: HOSPITAL | Age: 48
LOS: 1 days | End: 2023-01-19
Payer: MEDICAID

## 2023-01-19 DIAGNOSIS — Z12.11 ENCOUNTER FOR SCREENING FOR MALIGNANT NEOPLASM OF COLON: ICD-10-CM

## 2023-01-19 DIAGNOSIS — K21.9 GASTRO-ESOPHAGEAL REFLUX DISEASE WITHOUT ESOPHAGITIS: ICD-10-CM

## 2023-01-19 DIAGNOSIS — R11.0 NAUSEA: ICD-10-CM

## 2023-01-19 DIAGNOSIS — R14.0 ABDOMINAL DISTENSION (GASEOUS): ICD-10-CM

## 2023-01-19 DIAGNOSIS — Z00.8 ENCOUNTER FOR OTHER GENERAL EXAMINATION: ICD-10-CM

## 2023-01-19 PROCEDURE — 76700 US EXAM ABDOM COMPLETE: CPT

## 2023-01-19 PROCEDURE — 76700 US EXAM ABDOM COMPLETE: CPT | Mod: 26

## 2023-04-21 ENCOUNTER — APPOINTMENT (OUTPATIENT)
Dept: FAMILY MEDICINE | Facility: CLINIC | Age: 48
End: 2023-04-21
Payer: COMMERCIAL

## 2023-04-21 ENCOUNTER — NON-APPOINTMENT (OUTPATIENT)
Age: 48
End: 2023-04-21

## 2023-04-21 VITALS
OXYGEN SATURATION: 97 % | SYSTOLIC BLOOD PRESSURE: 114 MMHG | DIASTOLIC BLOOD PRESSURE: 70 MMHG | HEART RATE: 68 BPM | WEIGHT: 155 LBS | HEIGHT: 67 IN | TEMPERATURE: 98 F | BODY MASS INDEX: 24.33 KG/M2

## 2023-04-21 DIAGNOSIS — Z00.00 ENCOUNTER FOR GENERAL ADULT MEDICAL EXAMINATION W/OUT ABNORMAL FINDINGS: ICD-10-CM

## 2023-04-21 DIAGNOSIS — K21.9 GASTRO-ESOPHAGEAL REFLUX DISEASE W/OUT ESOPHAGITIS: ICD-10-CM

## 2023-04-21 DIAGNOSIS — Z13.31 ENCOUNTER FOR SCREENING FOR DEPRESSION: ICD-10-CM

## 2023-04-21 DIAGNOSIS — Z12.4 ENCOUNTER FOR SCREENING FOR MALIGNANT NEOPLASM OF CERVIX: ICD-10-CM

## 2023-04-21 DIAGNOSIS — J45.909 UNSPECIFIED ASTHMA, UNCOMPLICATED: ICD-10-CM

## 2023-04-21 PROCEDURE — 93000 ELECTROCARDIOGRAM COMPLETE: CPT

## 2023-04-21 PROCEDURE — 96127 BRIEF EMOTIONAL/BEHAV ASSMT: CPT

## 2023-04-21 PROCEDURE — 99396 PREV VISIT EST AGE 40-64: CPT | Mod: 25

## 2023-04-21 RX ORDER — NICOTINE 21 MG/24HR
21 PATCH, TRANSDERMAL 24 HOURS TRANSDERMAL DAILY
Qty: 1 | Refills: 2 | Status: ACTIVE | COMMUNITY
Start: 2022-02-11 | End: 1900-01-01

## 2023-04-21 NOTE — HISTORY OF PRESENT ILLNESS
[FreeTextEntry1] : CPE [de-identified] : 47 year old female presents for CPE. She feels well overall. \par Is concerned about her health as her brother is hospitalized on a vent s/p CVA. Is trying to cope with this.\par She recently had a UTI. Had reaction to macrobid and keflex. States urine is jono unless she drinks wanter. She currently started her period as well. \par Had colonoscopy in Feb.Needs mammo ref

## 2023-04-21 NOTE — PHYSICAL EXAM
[No Acute Distress] : no acute distress [Well-Appearing] : well-appearing [Normal Sclera/Conjunctiva] : normal sclera/conjunctiva [Normal Outer Ear/Nose] : the outer ears and nose were normal in appearance [No Respiratory Distress] : no respiratory distress  [No Accessory Muscle Use] : no accessory muscle use [Clear to Auscultation] : lungs were clear to auscultation bilaterally [Normal Rate] : normal rate  [Regular Rhythm] : with a regular rhythm [Soft] : abdomen soft [Non Tender] : non-tender [Normal Bowel Sounds] : normal bowel sounds [Non-distended] : non-distended [No Focal Deficits] : no focal deficits [Normal Affect] : the affect was normal [Normal Insight/Judgement] : insight and judgment were intact

## 2023-04-21 NOTE — PLAN
[FreeTextEntry1] : Cont all meds\par f/u labs\par Mammo ref\par colonoscopy 2/23\par f/u repeat ua with reflex cx \par hydration

## 2023-04-25 ENCOUNTER — TRANSCRIPTION ENCOUNTER (OUTPATIENT)
Age: 48
End: 2023-04-25

## 2023-06-02 ENCOUNTER — APPOINTMENT (OUTPATIENT)
Dept: FAMILY MEDICINE | Facility: CLINIC | Age: 48
End: 2023-06-02

## 2023-06-12 LAB
25(OH)D3 SERPL-MCNC: 28.5 NG/ML
ALBUMIN SERPL ELPH-MCNC: 4.6 G/DL
ALP BLD-CCNC: 87 U/L
ALT SERPL-CCNC: 20 U/L
ANION GAP SERPL CALC-SCNC: 12 MMOL/L
APPEARANCE: CLEAR
AST SERPL-CCNC: 23 U/L
BILIRUB SERPL-MCNC: 0.4 MG/DL
BILIRUBIN URINE: NEGATIVE
BLOOD URINE: NEGATIVE
BUN SERPL-MCNC: 9 MG/DL
CALCIUM SERPL-MCNC: 9.5 MG/DL
CHLORIDE SERPL-SCNC: 100 MMOL/L
CHOLEST SERPL-MCNC: 329 MG/DL
CO2 SERPL-SCNC: 28 MMOL/L
COLOR: YELLOW
CREAT SERPL-MCNC: 0.8 MG/DL
EGFR: 91 ML/MIN/1.73M2
ESTIMATED AVERAGE GLUCOSE: 123 MG/DL
GLUCOSE QUALITATIVE U: NEGATIVE MG/DL
GLUCOSE SERPL-MCNC: 98 MG/DL
HBA1C MFR BLD HPLC: 5.9 %
HDLC SERPL-MCNC: 47 MG/DL
KETONES URINE: NEGATIVE MG/DL
LDLC SERPL CALC-MCNC: 237 MG/DL
LEUKOCYTE ESTERASE URINE: NEGATIVE
NITRITE URINE: NEGATIVE
NONHDLC SERPL-MCNC: 281 MG/DL
PH URINE: 6.5
POTASSIUM SERPL-SCNC: 4.5 MMOL/L
PROT SERPL-MCNC: 7.2 G/DL
PROTEIN URINE: NEGATIVE MG/DL
SODIUM SERPL-SCNC: 141 MMOL/L
SPECIFIC GRAVITY URINE: 1.02
TRIGL SERPL-MCNC: 221 MG/DL
TSH SERPL-ACNC: 1.09 UIU/ML
UROBILINOGEN URINE: 1 MG/DL

## 2023-06-13 ENCOUNTER — APPOINTMENT (OUTPATIENT)
Dept: ULTRASOUND IMAGING | Facility: CLINIC | Age: 48
End: 2023-06-13

## 2023-06-13 ENCOUNTER — APPOINTMENT (OUTPATIENT)
Dept: MAMMOGRAPHY | Facility: CLINIC | Age: 48
End: 2023-06-13

## 2023-06-13 ENCOUNTER — RESULT REVIEW (OUTPATIENT)
Age: 48
End: 2023-06-13

## 2023-06-13 ENCOUNTER — OUTPATIENT (OUTPATIENT)
Dept: OUTPATIENT SERVICES | Facility: HOSPITAL | Age: 48
LOS: 1 days | End: 2023-06-13
Payer: MEDICAID

## 2023-06-13 DIAGNOSIS — Z12.39 ENCOUNTER FOR OTHER SCREENING FOR MALIGNANT NEOPLASM OF BREAST: ICD-10-CM

## 2023-06-13 DIAGNOSIS — Z98.82 BREAST IMPLANT STATUS: ICD-10-CM

## 2023-06-13 PROCEDURE — 77067 SCR MAMMO BI INCL CAD: CPT

## 2023-06-13 PROCEDURE — 77063 BREAST TOMOSYNTHESIS BI: CPT | Mod: 26

## 2023-06-13 PROCEDURE — 77067 SCR MAMMO BI INCL CAD: CPT | Mod: 26

## 2023-06-13 PROCEDURE — 77063 BREAST TOMOSYNTHESIS BI: CPT

## 2023-06-13 PROCEDURE — 76641 ULTRASOUND BREAST COMPLETE: CPT | Mod: 26,50

## 2023-06-13 PROCEDURE — 76641 ULTRASOUND BREAST COMPLETE: CPT

## 2023-06-28 ENCOUNTER — APPOINTMENT (OUTPATIENT)
Dept: ORTHOPEDIC SURGERY | Facility: CLINIC | Age: 48
End: 2023-06-28
Payer: COMMERCIAL

## 2023-06-28 VITALS — BODY MASS INDEX: 24.33 KG/M2 | HEIGHT: 67 IN | WEIGHT: 155 LBS

## 2023-06-28 DIAGNOSIS — M54.6 PAIN IN THORACIC SPINE: ICD-10-CM

## 2023-06-28 PROCEDURE — 72100 X-RAY EXAM L-S SPINE 2/3 VWS: CPT

## 2023-06-28 PROCEDURE — 72070 X-RAY EXAM THORAC SPINE 2VWS: CPT
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
## 2023-06-29 LAB
APOLIPOPROTEIN B: 163 MG/DL
CHOLESTEROL, TOTAL: 300 MG/DL
CHOLESTEROL/HDL RATIO: 5.9
HDL CHOLESTEROL: 51 MG/DL
HLD.LARGE SERPL-SCNC: 4417 NMOL/L
LDL MEDIUM: 479 NMOL/L
LDL SERPL QN: 212.7
LDL SERPL-SCNC: 1678 NMOL/L
LDL SMALL SERPL-SCNC: 464 NMOL/L
LDLC REAL SIZE PAT SERPL: ABNORMAL
LDLC SERPL CALC-MCNC: 208
LIPOPROTEIN (A): 58 NMOL/L
NON-HDL CHOLESTEROL: 249
TRIGLYCERIDES: 221 MG/DL

## 2023-07-06 ENCOUNTER — NON-APPOINTMENT (OUTPATIENT)
Age: 48
End: 2023-07-06

## 2023-07-07 ENCOUNTER — APPOINTMENT (OUTPATIENT)
Dept: CARDIOLOGY | Facility: CLINIC | Age: 48
End: 2023-07-07
Payer: COMMERCIAL

## 2023-07-07 PROCEDURE — 99205 OFFICE O/P NEW HI 60 MIN: CPT | Mod: 95

## 2023-07-11 ENCOUNTER — APPOINTMENT (OUTPATIENT)
Dept: NEUROLOGY | Facility: CLINIC | Age: 48
End: 2023-07-11
Payer: COMMERCIAL

## 2023-07-11 PROCEDURE — 99213 OFFICE O/P EST LOW 20 MIN: CPT | Mod: 95
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
## 2023-07-11 NOTE — HISTORY OF PRESENT ILLNESS
[Right Handed] : right handed [Family History of Seizures] : family history of seizures [Head Trauma] : head trauma [Divalproex (Depakote)] : divalproex (Depakote) [Grand Mal Status Epilepticus] : no [] : no [Lesional] : nonlesional [ Complications] : ~T no  complications [Meningitis or Encephalitis] : no meningitis or encephalitis [Developmental Delay] : no developmental delay [Stroke] : no stroke  [FreeTextEntry1] : staring spells since age 3-3yo [FreeTextEntry3] : Age 3-4 staring spells.  Went to ER once at that time, unrevealing.\par As child, may have had intermittent staring spells a few times a year.\par Around age 15 starting getting jerks when sleep deprived, might drop objects. Some confusion and staring spells  to 2-3 min.  Happening nearly daily at one point.  One day upon arousal, had episode of frequent twitching, and drops, fell down stairs.  Saw neurologist at that time at [facility], then Dr Paco Zepeda at [facility].  Was placed on VPA for years with very good control. \par No GTCS ever. [de-identified] : hit head around age 3, pool table fell on her. GMo with [facility].  MCou with absence.

## 2023-07-11 NOTE — CONSULT LETTER
[Dear  ___] : Dear  [unfilled], [Consult Letter:] : I had the pleasure of evaluating your patient, [unfilled]. [( Thank you for referring [unfilled] for consultation for _____ )] : Thank you for referring [unfilled] for consultation for [unfilled] [Please see my note below.] : Please see my note below. [Consult Closing:] : Thank you very much for allowing me to participate in the care of this patient.  If you have any questions, please do not hesitate to contact me. [Sincerely,] : Sincerely, [Yosvany Griffiths MD] : Yosvany Griffiths MD [Attending, Dept. of Neurology, Division of Epilepsy] : Attending, Dept. of Neurology, Division of Epilepsy [[facility]] : [facility] [ of Neurology] :  of Neurology [FreeTextEntry2] : Tiara Miner MD

## 2023-07-11 NOTE — DATA REVIEWED
[de-identified] : 6/2021 Apparent small area of volume loss/possible encephalomalacia in the left\par occipital lobe. Small WM change in right frontal region. [de-identified] : done remotely [de-identified] : 2016 [facility] nl,

## 2023-07-12 ENCOUNTER — APPOINTMENT (OUTPATIENT)
Dept: PEDIATRIC ALLERGY IMMUNOLOGY | Facility: CLINIC | Age: 48
End: 2023-07-12
Payer: COMMERCIAL

## 2023-07-12 ENCOUNTER — NON-APPOINTMENT (OUTPATIENT)
Age: 48
End: 2023-07-12

## 2023-07-12 VITALS
OXYGEN SATURATION: 97 % | HEART RATE: 60 BPM | DIASTOLIC BLOOD PRESSURE: 70 MMHG | SYSTOLIC BLOOD PRESSURE: 105 MMHG | HEIGHT: 67 IN | TEMPERATURE: 98.2 F | BODY MASS INDEX: 23.54 KG/M2 | WEIGHT: 150 LBS

## 2023-07-12 PROCEDURE — 94010 BREATHING CAPACITY TEST: CPT

## 2023-07-12 PROCEDURE — 99204 OFFICE O/P NEW MOD 45 MIN: CPT | Mod: 25

## 2023-07-12 RX ORDER — FLUTICASONE PROPIONATE AND SALMETEROL XINAFOATE 115; 21 UG/1; UG/1
115-21 AEROSOL, METERED RESPIRATORY (INHALATION) EVERY MORNING
Qty: 1 | Refills: 5 | Status: ACTIVE | COMMUNITY
Start: 2023-07-12 | End: 1900-01-01

## 2023-07-12 NOTE — ASSESSMENT
[FreeTextEntry1] : Allergies and side effects from medications:\par Zithromax: Urticaria.\par Macrobid: Urticaria, fever, chest symptoms, prolonged.\par Keflex: Worsened urticaria that was started from Macrobid.  Tolerated Keflex before and amoxicillin.\par Rosuvastatin: Urticaria and rash.\par Sulfa: Avoids based on family history, does not recall reaction.\par Paxil: Serotonin syndrome.\par Prednisone: Anxiety, insomnia; advised that it is not an allergic reaction, but rather a side effect.  Expect to get similar symptoms with exposure at higher doses.  May tolerate lower dose of prednisone or Medrol pack.  May expect some symptoms from products injected for pain control.\par Advised to schedule skin testing to beta-lactam antibiotics.  If negative, will schedule oral challenge at a different date.  Subsequently, suggested skin testing to see if she is allergic to sulfa medication.  Avoid Zithromax and Macrobid.  Referred for blood work for serum tryptase.\par Prolonged urticaria after Macrobid: Informed that the urticaria may last up to 3 months after it was triggered by medication.\par Asthma moderate persistent: History of asthma since childhood, longtime smoker.  Spirometry showed moderate obstruction.  Strongly advised to stop smoking.  Trial of Advair 115/21, 2 puffs once daily in the morning.  Will repeat spirometry upon return.  Albuterol as needed.  Blood work for total IgE, CBC with differential to reassess eosinophils and lymphocytes.\par Allergic rhinitis/postnasal drip: Blood work for allergies, environmental.\par Possible allergy to hair dye (urticaria several hours later, at the time when she was getting urticaria on and off): Apply hair dye on the arms in an area limited, 1 to 2 days before having the hair dyed.  Observe for any reaction.

## 2023-07-12 NOTE — PHYSICAL EXAM
[Alert] : alert [No Acute Distress] : no acute distress [Normal Voice/Communication] : normal voice communication [Supple] : the neck was supple [Normal Cervical Lymph Nodes] : cervical [Skin Intact] : skin intact  [No Rash] : no rash [Dermatographism] : no dermatographism [No clubbing] : no clubbing [No Cyanosis] : no cyanosis [Alert, Awake, Oriented as Age-Appropriate] : alert, awake, oriented as age appropriate [de-identified] : Occasional slightly productive cough. [de-identified] : Eyes clear. [de-identified] : Throat mild erythema of the posterior wall, scant postnasal drip.  Nasal mucosa red, mild bilateral stuffy nose, no discharge.  Tympanic membranes normal.  No sinus tenderness. [de-identified] : Chest bilateral coarse breath sounds, no clear wheezing or crackles. [de-identified] : S1-S2 regular, no murmurs. [de-identified] : Abdomen slightly distended, not tender, no organomegaly or masses detected.
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
## 2023-07-12 NOTE — REVIEW OF SYSTEMS
[Nasal Congestion] : nasal congestion [Post Nasal Drip] : post nasal drip [SOB with Exertion] : dyspnea on exertion [Cough] : cough [Wheezing] : wheezing [Urticaria] : urticaria [Recurrent Sinus Infections] : no recurrent sinus infections [Recurrent Throat Infections] : no recurrence of throat infections [Recurrent Bronchitis] : no recurrent bronchitis [Recurrent Ear Infections] : no recurrence or ear infections [Recurrent Skin Infections] : no recurrent skin infections [Recurrent Pneumonia] : no ~T recurrent pneumonia

## 2023-07-12 NOTE — SOCIAL HISTORY
[de-identified] : House with gas baseboard heating, central air conditioning, no carpet in the bedroom, 2 cats.  Smoking half pack per day from age 14.

## 2023-07-12 NOTE — REASON FOR VISIT
[Evaluation/Consultation] : an evaluation/consultation of [To Medication] : allergy to medication [Asthma] : asthma [Parent] : parent

## 2023-07-17 ENCOUNTER — NON-APPOINTMENT (OUTPATIENT)
Age: 48
End: 2023-07-17

## 2023-07-17 RX ORDER — FLUTICASONE FUROATE AND VILANTEROL TRIFENATATE 100; 25 UG/1; UG/1
100-25 POWDER RESPIRATORY (INHALATION) DAILY
Qty: 1 | Refills: 5 | Status: ACTIVE | COMMUNITY
Start: 2023-07-13 | End: 1900-01-01

## 2023-07-18 ENCOUNTER — NON-APPOINTMENT (OUTPATIENT)
Age: 48
End: 2023-07-18

## 2023-07-19 ENCOUNTER — APPOINTMENT (OUTPATIENT)
Dept: ORTHOPEDIC SURGERY | Facility: CLINIC | Age: 48
End: 2023-07-19

## 2023-07-20 ENCOUNTER — APPOINTMENT (OUTPATIENT)
Dept: PHYSICAL MEDICINE AND REHAB | Facility: CLINIC | Age: 48
End: 2023-07-20
Payer: COMMERCIAL

## 2023-07-20 ENCOUNTER — APPOINTMENT (OUTPATIENT)
Dept: PHYSICAL MEDICINE AND REHAB | Facility: CLINIC | Age: 48
End: 2023-07-20

## 2023-07-20 VITALS
BODY MASS INDEX: 23.54 KG/M2 | WEIGHT: 150 LBS | HEIGHT: 67 IN | DIASTOLIC BLOOD PRESSURE: 65 MMHG | SYSTOLIC BLOOD PRESSURE: 97 MMHG | HEART RATE: 72 BPM

## 2023-07-20 PROCEDURE — 99204 OFFICE O/P NEW MOD 45 MIN: CPT | Mod: GC

## 2023-07-20 NOTE — PHYSICAL EXAM
[FreeTextEntry1] : PE:\par Constitutional: In NAD, calm and cooperative\par MSK (Back)\par 	Inspection: no gross swelling identified\par 	Palpation: Tenderness of the bilateral lower lumbar paraspinals R>L \par 	ROM: Pain at end lumbar extension/flexion, extension to 15 degrees, full ROM lumbar flexion \par 	Strength: 5/5 strength in bilateral lower extremities\par 	Reflexes: 2+ Patella reflex bilaterally, 2+ Achilles reflex bilaterally, negative clonus bilaterally\par 	Sensation: Intact to light touch in bilateral lower extremities\par Special tests:\par SLR: Negative \par JAMES: Negative, slight pulling at the back on the RLE \par FADIR: Negative, slight pulling at the back on the RLE\par Facet loading: negative \par \par MSK (Neck/Midback)\par 	Inspection: no gross swelling identified\par 	Palpation: Tenderness midline cervical spine and thoracic spine\par 	ROM: AROM of C Spine intact\par 	Strength: 5/5 strength in bilateral upper extremities\par 	Reflexes: 2+ biceps reflex bilaterally, 2+ brachioradialis reflex bilaterally, negative Goyal bilaterally\par 	Sensation: Intact to light touch in bilateral upper extremities\par Special tests:\par Neers:Negative\par Empty Can: Negative \par Scarf: Negative \par Spurlings: negative

## 2023-07-20 NOTE — ASSESSMENT
[FreeTextEntry1] : Ms. ARNOLDO GARCIA is a 48 year old female with a PMHx of seizures, Anxiety, HLD, GERD, who presents with entire spine pain.  Burning radicular symptoms in the b/l LE  and radicular sharp shoulder pain radiating down the LUE. Denies any red flag signs. Will recommend:\par - Will obtain MRI C/T/L Spine\par - Mobic 7.5mg BID x 1 week, and then PRN thereafter. Patient advised on cardiac/gi/renal side effects. Patient encouraged to take medication with food and not with other NSAIDs. \par - Start PT 2-3x/week for stretching, strengthening, ROM exercises, HEP and modalities PRN including myofascial release, moist heat \par \par RTC in 4 weeks. Patient aware of red flag signs including any changes to their bowel/bladder control, groin numbness or new weakness. Patient knows to seek immediate attention by calling 911 or going to nearest ER if these symptoms appear. \par \par
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
## 2023-07-21 ENCOUNTER — APPOINTMENT (OUTPATIENT)
Dept: UROLOGY | Facility: CLINIC | Age: 48
End: 2023-07-21

## 2023-07-22 ENCOUNTER — APPOINTMENT (OUTPATIENT)
Dept: FAMILY MEDICINE | Facility: CLINIC | Age: 48
End: 2023-07-22
Payer: COMMERCIAL

## 2023-07-22 VITALS
HEART RATE: 65 BPM | OXYGEN SATURATION: 98 % | BODY MASS INDEX: 22.91 KG/M2 | WEIGHT: 146 LBS | SYSTOLIC BLOOD PRESSURE: 98 MMHG | HEIGHT: 67 IN | DIASTOLIC BLOOD PRESSURE: 65 MMHG

## 2023-07-22 DIAGNOSIS — R82.998 OTHER ABNORMAL FINDINGS IN URINE: ICD-10-CM

## 2023-07-22 DIAGNOSIS — T78.40XA ALLERGY, UNSPECIFIED, INITIAL ENCOUNTER: ICD-10-CM

## 2023-07-22 PROCEDURE — 99406 BEHAV CHNG SMOKING 3-10 MIN: CPT

## 2023-07-22 PROCEDURE — 99214 OFFICE O/P EST MOD 30 MIN: CPT | Mod: 25

## 2023-07-22 NOTE — PLAN
[FreeTextEntry1] : Dark urine\par - urology f/u\par - unsure if related to praluent \par \par Allergic reaction\par - likely insect bite\par - resolved \par \par HLD\par - f/u with cardio to discuss options. Consider coronary CT \par \par Cigarette use\par - counseled on importance of cessation, especially with her elevated cholesterol and difficulty tolerating statins.

## 2023-07-22 NOTE — HISTORY OF PRESENT ILLNESS
[FreeTextEntry1] : f/u  [de-identified] : 48 year old female presents for f/u. She was started on praluent last week by lipid specialist. THe following day after she took injection it was small amount of orange/dark yellow and urinating more frequently. The next day had her foot burning and redness which lasted 30 minutes. Later that day had lip swelling. She called allergist who believes reaction was secondary to bite and not medication. She then went to urgent care and was told no blood or infection. Was recommended to see urologist which she has an appointment with Monday.

## 2023-07-24 ENCOUNTER — APPOINTMENT (OUTPATIENT)
Dept: OBGYN | Facility: CLINIC | Age: 48
End: 2023-07-24

## 2023-07-24 ENCOUNTER — APPOINTMENT (OUTPATIENT)
Dept: UROLOGY | Facility: CLINIC | Age: 48
End: 2023-07-24
Payer: COMMERCIAL

## 2023-07-24 VITALS
HEART RATE: 66 BPM | HEIGHT: 66 IN | SYSTOLIC BLOOD PRESSURE: 105 MMHG | DIASTOLIC BLOOD PRESSURE: 72 MMHG | BODY MASS INDEX: 23.3 KG/M2 | WEIGHT: 145 LBS

## 2023-07-24 DIAGNOSIS — R31.0 GROSS HEMATURIA: ICD-10-CM

## 2023-07-24 DIAGNOSIS — M25.50 PAIN IN UNSPECIFIED JOINT: ICD-10-CM

## 2023-07-24 PROCEDURE — 99204 OFFICE O/P NEW MOD 45 MIN: CPT

## 2023-07-24 NOTE — HISTORY OF PRESENT ILLNESS
[FreeTextEntry1] : 49yo F presents for change in urination x 10 days. \par Pt was started on Praluent, a new inection medication. Since start of the medication, she has noted dark colored urine and even few drops of blood in her urine\par She also reports decrease in urine volume. Advised her to increase fluid intake.

## 2023-07-25 ENCOUNTER — LABORATORY RESULT (OUTPATIENT)
Age: 48
End: 2023-07-25

## 2023-07-25 ENCOUNTER — APPOINTMENT (OUTPATIENT)
Dept: PEDIATRIC ALLERGY IMMUNOLOGY | Facility: CLINIC | Age: 48
End: 2023-07-25
Payer: COMMERCIAL

## 2023-07-25 ENCOUNTER — RESULT REVIEW (OUTPATIENT)
Age: 48
End: 2023-07-25

## 2023-07-25 ENCOUNTER — NON-APPOINTMENT (OUTPATIENT)
Age: 48
End: 2023-07-25

## 2023-07-25 VITALS
DIASTOLIC BLOOD PRESSURE: 68 MMHG | TEMPERATURE: 97.6 F | HEART RATE: 72 BPM | OXYGEN SATURATION: 97 % | SYSTOLIC BLOOD PRESSURE: 98 MMHG

## 2023-07-25 DIAGNOSIS — T36.95XA ADVERSE EFFECT OF UNSPECIFIED SYSTEMIC ANTIBIOTIC, INITIAL ENCOUNTER: ICD-10-CM

## 2023-07-25 LAB
ANION GAP SERPL CALC-SCNC: 10 MMOL/L
BUN SERPL-MCNC: 11 MG/DL
CALCIUM SERPL-MCNC: 9.5 MG/DL
CHLORIDE SERPL-SCNC: 101 MMOL/L
CO2 SERPL-SCNC: 26 MMOL/L
CREAT SERPL-MCNC: 0.79 MG/DL
EGFR: 92 ML/MIN/1.73M2
GLUCOSE SERPL-MCNC: 80 MG/DL
POTASSIUM SERPL-SCNC: 3.9 MMOL/L
SODIUM SERPL-SCNC: 137 MMOL/L
URINE CYTOLOGY: NORMAL

## 2023-07-25 PROCEDURE — 95018 ALL TSTG PERQ&IQ DRUGS/BIOL: CPT

## 2023-07-25 PROCEDURE — 99214 OFFICE O/P EST MOD 30 MIN: CPT | Mod: 25

## 2023-07-25 PROCEDURE — 96372 THER/PROPH/DIAG INJ SC/IM: CPT

## 2023-07-25 PROCEDURE — 94640 AIRWAY INHALATION TREATMENT: CPT

## 2023-07-25 NOTE — HISTORY OF PRESENT ILLNESS
[de-identified] : In office to be evaluated for allergy to Bactrim.  Has multiple antibiotic allergies.  Last visit she reported that she avoids Bactrim because her mother is allergic.  Today her mother reports that the patient might have had a rash at the age 16 when she was treated for UTI, most likely with Bactrim.  She was recently diagnosed with UTI and prescribed Bactrim.  She wanted to know if she could take it.\par She has a history of urticaria with Zithromax, significant urticaria and fever from Macrobid, possibly urticaria from Keflex (received right after Macrobid), serotonin syndrome with Paxil, urticaria and itching with rosuvastatin, and itchy rash on contact with Neosporin.\par Due to familial hypercholesterolemia, she received Praluent.  First injection was received on 7/13/2023 (Thursday).  On Sunday morning at 4 AM woke up with stuffy nose, left foot felt burning hot and she noticed a sting adelina.  In the morning she woke up with swelling of the lips.  The sting adelina on her foot persisted until today.  Exactly 1 week later, on 7/20/2023, while sleeping in a different room, she woke up in the middle of the night with her right knee feeling hot and she noticed redness and 1 bump that crusted, possibly sting adelina.  She has on and off widespread joint pains and muscle stiffness.  In addition to the symptoms, next day after Praluent injection she noticed decreased urination, the urine was colored orange-brown and she had 1 episode of bleeding from the urethra.  She went to walk-in clinic and she had urine analysis and culture.  She was notified that the culture was positive for E. coli and she was prescribed Bactrim.  She is scheduled for a CT of the kidneys in 2 days.  Blood work showed normal kidney function as per patient.  She is concerned that she may be allergic to Praluent, she will not receive it anymore.  She wants to try a statin again.\par History of asthma and allergies, treated with immunotherapy for a few years when she was younger.  She smokes half pack per day from age 14.  She had a recent episode of viral illness with cough and postnasal drip.  She was prescribed Breo 100/25 from our office.  She had the first dose today in the morning.\par She just went for blood work prescribed last visit.  Results are not back yet.

## 2023-07-25 NOTE — PHYSICAL EXAM
[Alert] : alert [No Acute Distress] : no acute distress [Normal Voice/Communication] : normal voice communication [Supple] : the neck was supple [Soft] : abdomen soft [Normal Cervical Lymph Nodes] : cervical [Skin Intact] : skin intact  [No Rash] : no rash [No clubbing] : no clubbing [No Cyanosis] : no cyanosis [Alert, Awake, Oriented as Age-Appropriate] : alert, awake, oriented as age appropriate [de-identified] : Eyes clear. [de-identified] : Throat clear.  Nasal mucosa pink, no stuffiness or discharge.  Tympanic membranes normal.  No sinus tenderness. [de-identified] : Chest occasional slight end inspiratory wheeze, good air entry, no crackling. [de-identified] : S1-S2 regular, no murmurs.

## 2023-07-25 NOTE — REASON FOR VISIT
[Routine Follow-Up] : a routine follow-up visit for [To Medication] : allergy to medication [Parent] : parent

## 2023-07-25 NOTE — ASSESSMENT
[FreeTextEntry1] : History of possible rash with Bactrim: Skin testing to Bactrim was recommended.  Benefits and risks were discussed with patient including the risk of anaphylaxis, and patient agreed to the procedure.  Skin testing was negative.  In view of vague history and negative skin testing, suggested that patient takes the medication in the office and is watched for an hour subsequently.  Again the risks and benefits were discussed and patient agreed.  She took Bactrim SS 1 tablet in the office.  Advised that urticaria or skin rash may still happen several days into the treatment.  She is expected to take Bactrim SS 1 tablet twice daily for 4 days to clear the UTI.  In view of very limited options, it was decided that she should take it.\par She took the Bactrim tablet at 5:40 m with plan to observe for 1 hour. After taking the medication, ate some sweet potato chips and chewed gum. At 5:55 pm started to cough and feel scratchy throat, attributing to reflux.  She also became itchy and started scratching on her chest and abdomen.  Developed redness after scratching.  Also itchy in the right ear.  Chest was clear.  Blood pressure 110/60, pulse rate 68/min.  At 6:05 PM: Zyrtec liquid 10 mg (10 mL) given orally.  Epinephrine 1/1000, 0.3 mL injected IM in left thigh.  Xopenex 1.25 mg nebulizer given.\par After this treatment, within 15 minutes, symptoms cleared completely.\par Observed for 1 hour.  Emergency plan given and reviewed.  EpiPen technique reviewed (has at home).  Prednisone 10 mg 4 tablets given at hand, in case she develops urticaria later.  Advised to call if any symptoms recur.  Instructed to call ambulance and go to the emergency room if she needs to use EpiPen.\par 7:10 PM: Blood pressure 104/58, pulse 60/min, oxygen saturation 100%.  Chest clear, no rashes, no symptoms.\par Discharged home at 7:20 PM.  May take all her routine medications tonight.  Return to the office for Bactrim oral desensitization on 7/27/2023.  Advised that the procedure will take the entire day.\par Asthma moderate persistent: Continue Breo 100/25, 1 puff daily.  We will perform spirometry after several weeks.\par Addendum: Called patient at 8:35 PM.  Had slight itching on her leg on her way home, and feels slightly anxious.  No other symptoms, no urticaria.  Advised to take Benadryl 25 mg and albuterol 2 puffs at 9 PM.  If urticaria during the night, take Benadryl 25 mg again and prednisone 20 mg, call the office.  We will call tomorrow with status.

## 2023-07-26 ENCOUNTER — NON-APPOINTMENT (OUTPATIENT)
Age: 48
End: 2023-07-26

## 2023-07-26 LAB — BACTERIA UR CULT: NORMAL

## 2023-07-26 RX ORDER — EPINEPHRINE 0.3 MG/.3ML
0.3 INJECTION INTRAMUSCULAR
Qty: 2 | Refills: 2 | Status: ACTIVE | COMMUNITY
Start: 2023-07-26 | End: 1900-01-01

## 2023-07-27 ENCOUNTER — APPOINTMENT (OUTPATIENT)
Dept: PEDIATRIC ALLERGY IMMUNOLOGY | Facility: CLINIC | Age: 48
End: 2023-07-27
Payer: COMMERCIAL

## 2023-07-27 ENCOUNTER — APPOINTMENT (OUTPATIENT)
Dept: CT IMAGING | Facility: CLINIC | Age: 48
End: 2023-07-27

## 2023-07-27 VITALS
HEART RATE: 60 BPM | OXYGEN SATURATION: 98 % | SYSTOLIC BLOOD PRESSURE: 94 MMHG | TEMPERATURE: 98 F | DIASTOLIC BLOOD PRESSURE: 60 MMHG

## 2023-07-27 DIAGNOSIS — N39.0 URINARY TRACT INFECTION, SITE NOT SPECIFIED: ICD-10-CM

## 2023-07-27 DIAGNOSIS — Z88.2 ALLERGY STATUS TO SULFONAMIDES: ICD-10-CM

## 2023-07-27 DIAGNOSIS — J45.40 MODERATE PERSISTENT ASTHMA, UNCOMPLICATED: ICD-10-CM

## 2023-07-27 PROCEDURE — 99213 OFFICE O/P EST LOW 20 MIN: CPT

## 2023-07-27 RX ORDER — FAMOTIDINE 20 MG/1
20 TABLET, FILM COATED ORAL
Qty: 60 | Refills: 5 | Status: ACTIVE | COMMUNITY
Start: 2023-07-27 | End: 1900-01-01

## 2023-07-27 RX ORDER — MONTELUKAST 10 MG/1
10 TABLET, FILM COATED ORAL DAILY
Qty: 5 | Refills: 11 | Status: ACTIVE | COMMUNITY
Start: 2023-07-27 | End: 1900-01-01

## 2023-07-27 NOTE — ASSESSMENT
[FreeTextEntry1] : History of allergic reaction to Bactrim recently.  History of multiple reactions to medications: Decided to avoid Bactrim and not proceed with oral desensitization for UTI.  Antibiotic switched to Cipro 250 mg.  Patient came to the office to take it under observation.  She took Cipro 250 mg at the time that she was asymptomatic (itching and throat clearing after eating had cleared).  Observed for 90 minutes, no symptoms.  No rashes, chest clear when she was discharged home.  Continue Cipro 250 mg twice daily for 3 days, refill for another 3 days if UTI symptoms not cleared.\par Return to office for to statin desensitization prescribed by lipid specialist.\par Save montelukast 10 mg if needed as premedication before desensitization.\par May use famotidine 20 mg twice daily if she gets severe urticaria, along with H1 blocker.\par Asthma: Continue Breo 100/25 1 puff daily.

## 2023-07-27 NOTE — PHYSICAL EXAM
[Alert] : alert [No Acute Distress] : no acute distress [Normal Voice/Communication] : normal voice communication [Supple] : the neck was supple [Soft] : abdomen soft [Normal Cervical Lymph Nodes] : cervical [Skin Intact] : skin intact  [No Rash] : no rash [No clubbing] : no clubbing [No Cyanosis] : no cyanosis [Alert, Awake, Oriented as Age-Appropriate] : alert, awake, oriented as age appropriate [de-identified] : Eyes clear. [de-identified] : Throat minimal erythema.  Uvula midline.  Nasal mucosa pink, no stuffiness or discharge.  Tympanic membranes normal. [de-identified] : Chest clear, good air entry, no wheezing or crackles. [de-identified] : S1-S2 regular, no murmurs.

## 2023-07-27 NOTE — HISTORY OF PRESENT ILLNESS
[de-identified] : In office for further evaluation for allergy to medication.  2 days ago, had cough, itchy throat, itchy right ear, itchy skin after taking Bactrim SS 1 tablet, onset within 15 minutes.  After being treated with epinephrine, Zyrtec liquid 10 mg, and Xopenex 1.25 mg nebulizer, symptoms cleared promptly.  Upon going home, she had no further symptoms.  Initially it was planned to perform oral desensitization to Bactrim today, using liquid Bactrim.  However, the patient was not able to get liquid Bactrim, not available in the pharmacy.  Discussed alternatives, and decided to use alternative antibiotic: Ciprofloxacin 250 milligram twice daily for 3 days.  She has a possibility of allergy to Keflex as well, she had severe reaction to Macrobid and Zithromax.  Patient agreed to try Cipro but wanted to take the first tablet in the office under observation.  She arrived to the office, around lunchtime.  She ate a turkey sandwich and subsequently had throat clearing, and felt itchy in various parts of the body.  She gets like that sometimes after eating.  Observed for 1 hour until symptoms completely subsided.  Subsequently she was allowed to take a Cipro tablet and was observed in the office for 90 minutes.\par Asthma: Advised to take Breo every day, used this morning.\par Prescriptions for famotidine 20 mg and montelukast 10 mg were sent in the morning with a plan to be included in a possible premedication regimen for Bactrim desensitization.  Advised to save the prescriptions.  May pretreat with Singulair prior to statin desensitization.  Will not use it long-term, because patient suffers from anxiety.  Pepcid may be used for urticaria if severe, along with antihistamine.

## 2023-07-28 ENCOUNTER — NON-APPOINTMENT (OUTPATIENT)
Age: 48
End: 2023-07-28

## 2023-07-29 ENCOUNTER — APPOINTMENT (OUTPATIENT)
Dept: MRI IMAGING | Facility: CLINIC | Age: 48
End: 2023-07-29
Payer: MEDICAID

## 2023-07-29 ENCOUNTER — OUTPATIENT (OUTPATIENT)
Dept: OUTPATIENT SERVICES | Facility: HOSPITAL | Age: 48
LOS: 1 days | End: 2023-07-29
Payer: MEDICAID

## 2023-07-29 DIAGNOSIS — M54.50 LOW BACK PAIN, UNSPECIFIED: ICD-10-CM

## 2023-07-29 DIAGNOSIS — M54.6 PAIN IN THORACIC SPINE: ICD-10-CM

## 2023-07-29 PROCEDURE — 72148 MRI LUMBAR SPINE W/O DYE: CPT | Mod: 26

## 2023-07-29 PROCEDURE — 72148 MRI LUMBAR SPINE W/O DYE: CPT

## 2023-07-31 ENCOUNTER — APPOINTMENT (OUTPATIENT)
Dept: UROLOGY | Facility: CLINIC | Age: 48
End: 2023-07-31

## 2023-07-31 ENCOUNTER — APPOINTMENT (OUTPATIENT)
Dept: CARDIOLOGY | Facility: CLINIC | Age: 48
End: 2023-07-31
Payer: COMMERCIAL

## 2023-07-31 PROCEDURE — 99214 OFFICE O/P EST MOD 30 MIN: CPT | Mod: 95

## 2023-07-31 RX ORDER — EVOLOCUMAB 140 MG/ML
140 INJECTION, SOLUTION SUBCUTANEOUS
Qty: 6 | Refills: 3 | Status: ACTIVE | COMMUNITY
Start: 2023-07-31 | End: 1900-01-01

## 2023-08-01 ENCOUNTER — APPOINTMENT (OUTPATIENT)
Dept: MRI IMAGING | Facility: CLINIC | Age: 48
End: 2023-08-01

## 2023-08-02 ENCOUNTER — NON-APPOINTMENT (OUTPATIENT)
Age: 48
End: 2023-08-02

## 2023-08-03 ENCOUNTER — APPOINTMENT (OUTPATIENT)
Dept: PAIN MANAGEMENT | Facility: CLINIC | Age: 48
End: 2023-08-03

## 2023-08-10 ENCOUNTER — NON-APPOINTMENT (OUTPATIENT)
Age: 48
End: 2023-08-10

## 2023-08-10 ENCOUNTER — APPOINTMENT (OUTPATIENT)
Dept: PHYSICAL MEDICINE AND REHAB | Facility: CLINIC | Age: 48
End: 2023-08-10
Payer: COMMERCIAL

## 2023-08-10 ENCOUNTER — APPOINTMENT (OUTPATIENT)
Dept: PEDIATRIC ALLERGY IMMUNOLOGY | Facility: CLINIC | Age: 48
End: 2023-08-10

## 2023-08-10 VITALS
DIASTOLIC BLOOD PRESSURE: 68 MMHG | SYSTOLIC BLOOD PRESSURE: 98 MMHG | BODY MASS INDEX: 23.3 KG/M2 | WEIGHT: 145 LBS | HEIGHT: 66 IN

## 2023-08-10 PROCEDURE — 99213 OFFICE O/P EST LOW 20 MIN: CPT | Mod: GC
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
## 2023-08-10 NOTE — DATA REVIEWED
[FreeTextEntry1] : XAM: [id number] - MR SPINE LUMBAR  - ORDERED BY: SHAWNA POTTER   PROCEDURE DATE:  07/29/2023    INTERPRETATION:  CLINICAL INFORMATION: Low back pain  ADDITIONAL CLINICAL INFORMATION: Low back muscle strain S39.012A  TECHNIQUE: Multiplanar, multisequence MRI was performed of the lumbar spine. IV Contrast: NONE  PRIOR STUDIES: No priors available for comparison.  FINDINGS: Conus terminates at the L1-2 level and is normal in signal. Vertebral body heights are maintained. There is a moderate dextroscoliosis. Alignment is otherwise normal.  There is disc degeneration at L4-L5 and L5-S1. There is severe disc height loss at L4-L5 and L5-S1.  L1-L2, L2-L3, L3-L4: No disc herniations. No spinal canal or foraminal narrowing.  L4-L5: Disc bulge with osseous ridging which is greater on the right. Mild right foraminal narrowing. No spinal canal narrowing.  L5-S1: Disc bulge with osseous ridging which is greater on the right. Mild bilateral facet arthrosis. Moderate right foraminal narrowing. No spinal canal narrowing.  The imaged portions of the sacroiliac joints are unremarkable. There are sacral Tarlov cysts.  There is mild atrophy of the posterior paraspinal musculature overlying the lower lumbar spine and sacrum.  IMPRESSION:  Dextroscoliosis with lower lumbar spondylosis, as above. Mild right foraminal narrowing at L4-L5 and moderate right foraminal narrowing at L5-S1. No spinal canal narrowing at any level.  --- End of Report ---       QASIM YOUNG MD; Attending Radiologist This document has been electronically signed. Aug  1 2023  5:16PM

## 2023-08-10 NOTE — PHYSICAL EXAM
[FreeTextEntry1] : PE: Constitutional: In NAD, calm and cooperative MSK (Back) 	Inspection: no gross swelling identified 	Palpation: non tender 	ROM: WNL without pain 	Strength: 5/5 strength in bilateral lower extremities 	Reflexes: 2+ Patella reflex bilaterally, 2+ Achilles reflex bilaterally, negative clonus bilaterally 	Sensation: Intact to light touch in bilateral lower extremities  MSK (Neck/Midback) 	Inspection: no gross swelling identified 	Palpation: Tenderness midline cervical spine and thoracic spine left sided paraspinals 	ROM: AROM of C Spine intact 	Strength: 5/5 strength in bilateral upper extremities 	Reflexes: 2+ biceps reflex bilaterally, 2+ brachioradialis reflex bilaterally, negative Goyal bilaterally 	Sensation: Intact to light touch in bilateral upper extremities Special tests: +hawkin's

## 2023-08-10 NOTE — ASSESSMENT
[FreeTextEntry1] : Ms. ARNOLDO GARCIA is a 48 year old female with a PMHx of seizures, Anxiety, HLD, GERD, who presents with entire spine pain.  Lumbar pain greatly improved with residual intermittent cervical/thoracic pain. Denies any red flag signs. Will recommend: - Reviewed MR L Spine with patient - MRI C/T spine was denied by insurance, will continue PT for now - Continue PT 2-3x/week for stretching, strengthening, ROM exercises, HEP and modalities PRN including myofascial release, moist heat   RTC in 4 weeks. Patient aware of red flag signs including any changes to their bowel/bladder control, groin numbness or new weakness. Patient knows to seek immediate attention by calling 911 or going to nearest ER if these symptoms appear.

## 2023-08-14 ENCOUNTER — OUTPATIENT (OUTPATIENT)
Dept: OUTPATIENT SERVICES | Facility: HOSPITAL | Age: 48
LOS: 1 days | End: 2023-08-14
Payer: COMMERCIAL

## 2023-08-14 ENCOUNTER — APPOINTMENT (OUTPATIENT)
Dept: CT IMAGING | Facility: CLINIC | Age: 48
End: 2023-08-14
Payer: COMMERCIAL

## 2023-08-14 DIAGNOSIS — R31.0 GROSS HEMATURIA: ICD-10-CM

## 2023-08-14 PROCEDURE — 74178 CT ABD&PLV WO CNTR FLWD CNTR: CPT

## 2023-08-14 PROCEDURE — 74178 CT ABD&PLV WO CNTR FLWD CNTR: CPT | Mod: 26

## 2023-08-18 ENCOUNTER — APPOINTMENT (OUTPATIENT)
Dept: ULTRASOUND IMAGING | Facility: CLINIC | Age: 48
End: 2023-08-18

## 2023-08-21 ENCOUNTER — APPOINTMENT (OUTPATIENT)
Dept: UROLOGY | Facility: CLINIC | Age: 48
End: 2023-08-21
Payer: COMMERCIAL

## 2023-08-21 VITALS
BODY MASS INDEX: 24.11 KG/M2 | SYSTOLIC BLOOD PRESSURE: 108 MMHG | RESPIRATION RATE: 14 BRPM | WEIGHT: 150 LBS | DIASTOLIC BLOOD PRESSURE: 72 MMHG | HEIGHT: 66 IN | HEART RATE: 67 BPM

## 2023-08-21 DIAGNOSIS — R34 ANURIA AND OLIGURIA: ICD-10-CM

## 2023-08-21 PROCEDURE — 99213 OFFICE O/P EST LOW 20 MIN: CPT

## 2023-08-21 NOTE — HISTORY OF PRESENT ILLNESS
[FreeTextEntry1] : 47yo F presents for f/u SHe was told she had +UTI from urgent care. completed course of abx. NO more hematuria episode She still reports dark urine color & decreased urine stream since she started Praluent, new cholesterol infection. She only took once 1 month ago.  Pt had neg CT urogram , neg cytology, normal Cr

## 2023-08-21 NOTE — ASSESSMENT
[FreeTextEntry1] : LUTS: could've been related to UTI recheck culture if pt has persistent weak stream then we can proceed with cystoscopy to evaluate

## 2023-08-22 ENCOUNTER — APPOINTMENT (OUTPATIENT)
Dept: CARDIOLOGY | Facility: CLINIC | Age: 48
End: 2023-08-22
Payer: COMMERCIAL

## 2023-08-22 VITALS
HEIGHT: 66 IN | BODY MASS INDEX: 24.11 KG/M2 | WEIGHT: 150 LBS | DIASTOLIC BLOOD PRESSURE: 62 MMHG | HEART RATE: 67 BPM | SYSTOLIC BLOOD PRESSURE: 102 MMHG

## 2023-08-22 DIAGNOSIS — F17.210 NICOTINE DEPENDENCE, CIGARETTES, UNCOMPLICATED: ICD-10-CM

## 2023-08-22 PROCEDURE — 93000 ELECTROCARDIOGRAM COMPLETE: CPT

## 2023-08-22 PROCEDURE — 99406 BEHAV CHNG SMOKING 3-10 MIN: CPT

## 2023-08-22 PROCEDURE — 99214 OFFICE O/P EST MOD 30 MIN: CPT | Mod: 25

## 2023-08-22 RX ORDER — ALIROCUMAB 150 MG/ML
150 INJECTION, SOLUTION SUBCUTANEOUS
Qty: 6 | Refills: 3 | Status: DISCONTINUED | COMMUNITY
Start: 2023-07-07 | End: 2023-08-22

## 2023-08-22 NOTE — DISCUSSION/SUMMARY
[FreeTextEntry1] : Pt is a 47 y/o F smoker PMH HLD (likely familial with statin intolerance), preDM, father MI 48.   Family History: We discussed the importance of aggressive risk factor modification, including continuing medications as directed, following a healthy diet of unprocessed, low saturated fat and carbohydrate diet as close to a plant based or Mediterranean as possible, regular exercise at least 30 minutes of cardiovascular exercise daily.  Also advised to report any symptoms immediately.  check CCTA to eval for CAD check CUS Will check transthoracic echocardiogram to evaluate left ventricular function and assess for any structural abnormalities  QUIT SMOKING!!!!  HLD: Repatha to be started at allergists office Advised lifestyle modifications   Most recent available lab results were reviewed with pt. The described plan was discussed with the pt and mother.  All questions and concerns were addressed to the best of my knowledge.  [EKG obtained to assist in diagnosis and management of assessed problem(s)] : EKG obtained to assist in diagnosis and management of assessed problem(s)

## 2023-08-22 NOTE — HISTORY OF PRESENT ILLNESS
[FreeTextEntry1] : Pt is a 49 y/o F smoker who is referred here today by their PCP for evaluation.  Pt has PMH HLD (likely familial with statin intolerance), preDM, father MI 48.  She is following with Dr Mcdonough (lipid specialist).  She was started on Praluent and then experienced L foot rash and lip swelling - she saw allergist who was not sure if it was due to Praluent.  She has stopped the Praluent.  Repatha was ordered but she has not started yet - she thinks she may start it in her allergist office.  She denies CP, SOB, diaphoresis, palpitations, dizziness, syncope, LE edema, PND, orthopnea.  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

## 2023-08-24 LAB — BACTERIA UR CULT: NORMAL

## 2023-08-30 ENCOUNTER — APPOINTMENT (OUTPATIENT)
Dept: CARDIOLOGY | Facility: CLINIC | Age: 48
End: 2023-08-30

## 2023-09-12 ENCOUNTER — APPOINTMENT (OUTPATIENT)
Dept: ULTRASOUND IMAGING | Facility: CLINIC | Age: 48
End: 2023-09-12

## 2023-09-14 ENCOUNTER — APPOINTMENT (OUTPATIENT)
Dept: PHYSICAL MEDICINE AND REHAB | Facility: CLINIC | Age: 48
End: 2023-09-14

## 2023-09-15 ENCOUNTER — APPOINTMENT (OUTPATIENT)
Dept: CARDIOLOGY | Facility: CLINIC | Age: 48
End: 2023-09-15

## 2023-10-05 ENCOUNTER — OUTPATIENT (OUTPATIENT)
Dept: OUTPATIENT SERVICES | Facility: HOSPITAL | Age: 48
LOS: 1 days | End: 2023-10-05
Payer: COMMERCIAL

## 2023-10-05 ENCOUNTER — RESULT REVIEW (OUTPATIENT)
Age: 48
End: 2023-10-05

## 2023-10-05 ENCOUNTER — APPOINTMENT (OUTPATIENT)
Dept: CT IMAGING | Facility: CLINIC | Age: 48
End: 2023-10-05
Payer: COMMERCIAL

## 2023-10-05 DIAGNOSIS — Z00.8 ENCOUNTER FOR OTHER GENERAL EXAMINATION: ICD-10-CM

## 2023-10-05 PROCEDURE — 75574 CT ANGIO HRT W/3D IMAGE: CPT

## 2023-10-05 PROCEDURE — 75574 CT ANGIO HRT W/3D IMAGE: CPT | Mod: 26

## 2023-10-09 ENCOUNTER — APPOINTMENT (OUTPATIENT)
Dept: CARDIOLOGY | Facility: CLINIC | Age: 48
End: 2023-10-09
Payer: COMMERCIAL

## 2023-10-09 PROCEDURE — 93880 EXTRACRANIAL BILAT STUDY: CPT | Mod: 26

## 2023-10-11 ENCOUNTER — APPOINTMENT (OUTPATIENT)
Dept: CARDIOLOGY | Facility: CLINIC | Age: 48
End: 2023-10-11
Payer: COMMERCIAL

## 2023-10-11 DIAGNOSIS — R06.09 OTHER FORMS OF DYSPNEA: ICD-10-CM

## 2023-10-11 PROCEDURE — 99214 OFFICE O/P EST MOD 30 MIN: CPT | Mod: 95

## 2023-10-16 ENCOUNTER — APPOINTMENT (OUTPATIENT)
Dept: RHEUMATOLOGY | Facility: CLINIC | Age: 48
End: 2023-10-16

## 2023-12-31 PROBLEM — Z23 NEED FOR 23-POLYVALENT PNEUMOCOCCAL POLYSACCHARIDE VACCINE: Status: ACTIVE | Noted: 2019-10-02

## 2024-01-09 ENCOUNTER — APPOINTMENT (OUTPATIENT)
Dept: CARDIOLOGY | Facility: CLINIC | Age: 49
End: 2024-01-09

## 2024-02-22 ENCOUNTER — APPOINTMENT (OUTPATIENT)
Dept: PHYSICAL MEDICINE AND REHAB | Facility: CLINIC | Age: 49
End: 2024-02-22
Payer: COMMERCIAL

## 2024-02-22 VITALS
WEIGHT: 150 LBS | BODY MASS INDEX: 24.11 KG/M2 | RESPIRATION RATE: 14 BRPM | SYSTOLIC BLOOD PRESSURE: 109 MMHG | DIASTOLIC BLOOD PRESSURE: 71 MMHG | HEIGHT: 66 IN | HEART RATE: 102 BPM

## 2024-02-22 DIAGNOSIS — M54.6 PAIN IN THORACIC SPINE: ICD-10-CM

## 2024-02-22 DIAGNOSIS — M79.10 MYALGIA, UNSPECIFIED SITE: ICD-10-CM

## 2024-02-22 PROCEDURE — 20553 NJX 1/MLT TRIGGER POINTS 3/>: CPT

## 2024-02-22 PROCEDURE — 99214 OFFICE O/P EST MOD 30 MIN: CPT | Mod: 25
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
## 2024-02-22 NOTE — PHYSICAL EXAM
[FreeTextEntry1] : PE: Constitutional: In NAD, calm and cooperative MSK (Low Back)  Inspection: no gross swelling identified  Palpation: TTP over lumbar paraspinals  ROM: Able to flex 90 degrees, and extend to 15 degrees  Strength: 5/5 strength in bilateral lower extremities  Reflexes: 2+ Patella reflex bilaterally, 2+ Achilles reflex bilaterally, negative clonus bilaterally  Sensation: Intact to light touch in bilateral lower extremities  MSK (Neck/Midback)  Inspection: no gross swelling identified  Palpation: Tenderness midline cervical spine and thoracic spine left sided paraspinals and rhomboids  ROM: AROM of C Spine intact  Strength: 5/5 strength in bilateral upper extremities  Reflexes: 2+ biceps reflex bilaterally, 2+ brachioradialis reflex bilaterally, negative Goyal bilaterally  Sensation: Intact to light touch in bilateral upper extremities Special tests: Spurlings negative bilaterally

## 2024-02-22 NOTE — ASSESSMENT
[FreeTextEntry1] : Ms. ARNOLDO GARCIA is a 48 year old female with a PMHx of seizures, Anxiety, HLD, GERD, who presents with entire spine pain, worsening a few months ago around time of brother's death. Pain is likely myofascial in nature coupled with anxiety and stress from her recent loss. Denies any red flag signs. Will recommend: - TPI performed today, tolerated well - Restart PT 2-3x/week for stretching, strengthening, ROM exercises, HEP and modalities PRN including myofascial release, moist heat  - Had an extensive conservation with patient and her mother about her anxiety and her stress level. Patient is pending a consultation with a therapist for which I told her will likely help her pain and stress levels. Denies any SI/HI.   RTC in 4 weeks. Patient aware of red flag signs including any changes to their bowel/bladder control, groin numbness or new weakness. Patient knows to seek immediate attention by calling 911 or going to nearest ER if these symptoms appear.   I have personally spent 30 minutes of time today on the encounter excluding any separately billed tests or procedures. This work included: reviewing prior records, obtaining and reviewing history, performing exam, counseling patient, ordering tests or procedures, documentation and care coordination.

## 2024-02-26 ENCOUNTER — APPOINTMENT (OUTPATIENT)
Dept: CARDIOLOGY | Facility: CLINIC | Age: 49
End: 2024-02-26
Payer: COMMERCIAL

## 2024-02-26 PROCEDURE — 93306 TTE W/DOPPLER COMPLETE: CPT

## 2024-02-27 ENCOUNTER — APPOINTMENT (OUTPATIENT)
Dept: FAMILY MEDICINE | Facility: CLINIC | Age: 49
End: 2024-02-27
Payer: COMMERCIAL

## 2024-02-27 DIAGNOSIS — F32.A ANXIETY DISORDER, UNSPECIFIED: ICD-10-CM

## 2024-02-27 DIAGNOSIS — F41.9 ANXIETY DISORDER, UNSPECIFIED: ICD-10-CM

## 2024-02-27 PROCEDURE — 99442: CPT

## 2024-02-27 NOTE — REVIEW OF SYSTEMS
[Joint Pain] : joint pain [Joint Stiffness] : joint stiffness [Muscle Pain] : muscle pain [Back Pain] : back pain

## 2024-02-27 NOTE — PLAN
[FreeTextEntry1] : Anxiety/depression - behavioral health referral  Back/neck pain - has neurology appt  Advised to make an appt in office in a few weeks

## 2024-02-27 NOTE — HISTORY OF PRESENT ILLNESS
[Home] : at home, [unfilled] , at the time of the visit. [Medical Office: ([facility])___] : at the medical office located in  [Verbal consent obtained from patient] : the patient, [unfilled] [FreeTextEntry8] : 48 year old female presents with many concerns. She lost her brother and is not handling it well. She has been having panic attacks daily since. Is interested in therapist. She is also anxious about her health. Is having back pain. Saw PM&R who referred her for PT but could not finish sessions as physical therapist advised her it was "neurological and not musculoskeletal". She has appt with neurology tomorrow

## 2024-02-28 ENCOUNTER — RX RENEWAL (OUTPATIENT)
Age: 49
End: 2024-02-28

## 2024-02-28 ENCOUNTER — NON-APPOINTMENT (OUTPATIENT)
Age: 49
End: 2024-02-28

## 2024-02-28 ENCOUNTER — APPOINTMENT (OUTPATIENT)
Dept: NEUROLOGY | Facility: CLINIC | Age: 49
End: 2024-02-28
Payer: COMMERCIAL

## 2024-02-28 VITALS
WEIGHT: 150 LBS | DIASTOLIC BLOOD PRESSURE: 67 MMHG | HEART RATE: 76 BPM | BODY MASS INDEX: 24.11 KG/M2 | SYSTOLIC BLOOD PRESSURE: 104 MMHG | HEIGHT: 66 IN

## 2024-02-28 DIAGNOSIS — M47.814 SPONDYLOSIS W/OUT MYELOPATHY OR RADICULOPATHY, THORACIC REGION: ICD-10-CM

## 2024-02-28 DIAGNOSIS — G57.93 UNSPECIFIED MONONEUROPATHY OF BILATERAL LOWER LIMBS: ICD-10-CM

## 2024-02-28 DIAGNOSIS — G40.909 EPILEPSY, UNSPECIFIED, NOT INTRACTABLE, W/OUT STATUS EPILEPTICUS: ICD-10-CM

## 2024-02-28 DIAGNOSIS — M54.50 LOW BACK PAIN, UNSPECIFIED: ICD-10-CM

## 2024-02-28 DIAGNOSIS — M54.2 CERVICALGIA: ICD-10-CM

## 2024-02-28 DIAGNOSIS — M54.16 RADICULOPATHY, LUMBAR REGION: ICD-10-CM

## 2024-02-28 DIAGNOSIS — M41.9 SCOLIOSIS, UNSPECIFIED: ICD-10-CM

## 2024-02-28 DIAGNOSIS — M62.830 MUSCLE SPASM OF BACK: ICD-10-CM

## 2024-02-28 DIAGNOSIS — M54.12 RADICULOPATHY, CERVICAL REGION: ICD-10-CM

## 2024-02-28 PROCEDURE — G2211 COMPLEX E/M VISIT ADD ON: CPT

## 2024-02-28 PROCEDURE — 99214 OFFICE O/P EST MOD 30 MIN: CPT

## 2024-02-28 RX ORDER — CYCLOBENZAPRINE HYDROCHLORIDE 5 MG/1
5 TABLET, FILM COATED ORAL
Qty: 60 | Refills: 3 | Status: ACTIVE | COMMUNITY
Start: 2024-02-28 | End: 1900-01-01

## 2024-02-28 RX ORDER — DIVALPROEX SODIUM 250 MG/1
250 TABLET, DELAYED RELEASE ORAL
Qty: 450 | Refills: 3 | Status: DISCONTINUED | COMMUNITY
Start: 2023-03-20 | End: 2024-02-28

## 2024-02-28 NOTE — DATA REVIEWED
[de-identified] : 6/2021 Apparent small area of volume loss/possible encephalomalacia in the left\par  occipital lobe. Small WM change in right frontal region. [de-identified] : done remotely [de-identified] : 2016 [facility] nl,

## 2024-02-28 NOTE — CONSULT LETTER
[Dear  ___] : Dear  [unfilled], [Consult Letter:] : I had the pleasure of evaluating your patient, [unfilled]. [( Thank you for referring [unfilled] for consultation for _____ )] : Thank you for referring [unfilled] for consultation for [unfilled] [Please see my note below.] : Please see my note below. [Consult Closing:] : Thank you very much for allowing me to participate in the care of this patient.  If you have any questions, please do not hesitate to contact me. [Sincerely,] : Sincerely, [Yosvany Griffiths MD] : Yosvany Griffiths MD [Attending, Dept. of Neurology, Division of Epilepsy] : Attending, Dept. of Neurology, Division of Epilepsy [[facility]] : [facility] [ of Neurology] :  of Neurology [FreeTextEntry2] : Tiara Miner MD

## 2024-02-28 NOTE — PHYSICAL EXAM
[FreeTextEntry1] : Constitutional: alert and in no acute distress. Psychiatric: oriented to person, place, and time, insight and judgment were intact and the affect was normal. Neurologic:   Mental Status:. gregarious, very pleasant.   Orientation: oriented to person, oriented to place and oriented to time.   Attention: normal concentrating ability and visual attention was not decreased.   Language: no difficulty naming common objects, no difficulty repeating a phrase, no difficulty writing a sentence, fluency intact, comprehension intact and reading intact.   Fund of knowledge: displays adequate knowledge of personal past history.   Cranial Nerves: extraocular motion intact, facial sensation intact symmetrically, face symmetrical, hearing was intact bilaterally, tongue and palate midline, head turning and shoulder shrug symmetric and there was no tongue deviation with protrusion . RLQ VF defect.   Motor: muscle tone was normal in all four extremities, muscle strength was normal in all four extremities and normal bulk in all four extremities.   Motor Strength:. the patient is right hand dominant.   Sensory exam: light touch was intact.  paraesthesias to both feet to calfs Coordination:. normal gait. balance was intact. there was no past-pointing. no tremor present.   Deep tendon reflexes: Biceps right 2+. Biceps left 2+.  Triceps right `+. Triceps left 1+.  Brachioradialis right 1+. Brachioradialis left 1+.  Patella right 3+. Patella left 3+.  Ankle jerk right 1+. Ankle jerk left 1+.   Plantar responses normal on the right, normal on the left.
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
## 2024-03-02 ENCOUNTER — APPOINTMENT (OUTPATIENT)
Dept: MRI IMAGING | Facility: CLINIC | Age: 49
End: 2024-03-02
Payer: COMMERCIAL

## 2024-03-02 ENCOUNTER — OUTPATIENT (OUTPATIENT)
Dept: OUTPATIENT SERVICES | Facility: HOSPITAL | Age: 49
LOS: 1 days | End: 2024-03-02
Payer: COMMERCIAL

## 2024-03-02 DIAGNOSIS — M54.2 CERVICALGIA: ICD-10-CM

## 2024-03-02 PROCEDURE — 72146 MRI CHEST SPINE W/O DYE: CPT | Mod: 26

## 2024-03-02 PROCEDURE — 72141 MRI NECK SPINE W/O DYE: CPT

## 2024-03-02 PROCEDURE — 72141 MRI NECK SPINE W/O DYE: CPT | Mod: 26

## 2024-03-02 PROCEDURE — 72146 MRI CHEST SPINE W/O DYE: CPT

## 2024-03-07 ENCOUNTER — TRANSCRIPTION ENCOUNTER (OUTPATIENT)
Age: 49
End: 2024-03-07

## 2024-03-19 ENCOUNTER — APPOINTMENT (OUTPATIENT)
Dept: OBGYN | Facility: CLINIC | Age: 49
End: 2024-03-19
Payer: COMMERCIAL

## 2024-03-19 VITALS
HEIGHT: 66 IN | DIASTOLIC BLOOD PRESSURE: 60 MMHG | RESPIRATION RATE: 15 BRPM | SYSTOLIC BLOOD PRESSURE: 108 MMHG | WEIGHT: 153 LBS | BODY MASS INDEX: 24.59 KG/M2 | HEART RATE: 74 BPM

## 2024-03-19 DIAGNOSIS — Z01.419 ENCOUNTER FOR GYNECOLOGICAL EXAMINATION (GENERAL) (ROUTINE) W/OUT ABNORMAL FINDINGS: ICD-10-CM

## 2024-03-19 DIAGNOSIS — R92.30 DENSE BREASTS, UNSPECIFIED: ICD-10-CM

## 2024-03-19 DIAGNOSIS — Z86.19 PERSONAL HISTORY OF OTHER INFECTIOUS AND PARASITIC DISEASES: ICD-10-CM

## 2024-03-19 PROCEDURE — 99386 PREV VISIT NEW AGE 40-64: CPT

## 2024-03-19 NOTE — PHYSICAL EXAM
[Alert] : alert [Appropriately responsive] : appropriately responsive [No Acute Distress] : no acute distress [Soft] : soft [Non-tender] : non-tender [Non-distended] : non-distended [No Lesions] : no lesions [No HSM] : No HSM [No Mass] : no mass [Examination Of The Breasts] : a normal appearance [Oriented x3] : oriented x3 [] : implants [No Masses] : no breast masses were palpable [Labia Minora] : normal [Labia Majora] : normal [Normal] : normal [Tenderness] : nontender [Mass ___ cm] : no uterine mass was palpated [Enlarged ___ wks] : not enlarged [FreeTextEntry5] : pap done [Uterine Adnexae] : non-palpable

## 2024-03-19 NOTE — PLAN
[FreeTextEntry1] : perimenopause reviewed with patient. Agree with staying on sertraline 100mg, may also help hot flashes.

## 2024-03-19 NOTE — HISTORY OF PRESENT ILLNESS
[Previously active] : previously active [TextBox_4] : last exam 2-3 yrs ago, pap nml, had abn pap in  had cryotherapy and paps normal since. LMP 2023, prior to that monthly 3-4 days, no issues with them, + hot flashes She also skipped May 2023 which was when her brother . Menses once/month other than that. pt was on sertraline 50mg for anxiety/depression but was only taking it q 2-3 days. She began having panic attacks (2-3/day last month), so sertraline is now 100mg qd and pt is feeling better. Sees therapist. Hx pf herpes, takes valtrex prn, every few months, needs renewal [Patient reported colonoscopy was normal] : Patient reported colonoscopy was normal [BreastSonogramDate] : 6/2023 [Mammogramdate] : 6/2023 [PapSmeardate] : 2-3 yrs ago [TextBox_43] : fu 10 yrs [ColonoscopyDate] : 2/2023 [Men] : men [Vaginal] : vaginal [FreeTextEntry2] : not since 2011

## 2024-03-21 ENCOUNTER — APPOINTMENT (OUTPATIENT)
Dept: PHYSICAL MEDICINE AND REHAB | Facility: CLINIC | Age: 49
End: 2024-03-21

## 2024-03-21 LAB — HPV HIGH+LOW RISK DNA PNL CVX: NOT DETECTED

## 2024-03-22 LAB — CYTOLOGY CVX/VAG DOC THIN PREP: NORMAL
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
## 2024-03-25 NOTE — ASSESSMENT
[FreeTextEntry1] : Ms. ARNOLDO GARCIA is a 48 year old female with a PMHx of seizures, Anxiety, HLD, GERD, who presents with entire spine pain, worsening a few months ago around time of brother's death. Pain is likely myofascial in nature coupled with anxiety and stress from her recent loss. Denies any red flag signs. Will recommend: - TPI performed today, tolerated well - Restart PT 2-3x/week for stretching, strengthening, ROM exercises, HEP and modalities PRN including myofascial release, moist heat - Had an extensive conservation with patient and her mother about her anxiety and her stress level. Patient is pending a consultation with a therapist for which I told her will likely help her pain and stress levels. Denies any SI/HI.  RTC in 4 weeks. Patient aware of red flag signs including any changes to their bowel/bladder control, groin numbness or new weakness. Patient knows to seek immediate attention by calling 911 or going to nearest ER if these symptoms appear.

## 2024-03-25 NOTE — PHYSICAL EXAM
[FreeTextEntry1] : PE: Constitutional: In NAD, calm and cooperative MSK (Low Back)  Inspection: no gross swelling identified  Palpation: TTP over lumbar paraspinals  ROM: Able to flex 90 degrees, and extend to 15 degrees  Strength: 5/5 strength in bilateral lower extremities  Reflexes: 2+ Patella reflex bilaterally, 2+ Achilles reflex bilaterally, negative clonus bilaterally  Sensation: Intact to light touch in bilateral lower extremities  MSK (Neck/Midback)  Inspection: no gross swelling identified  Palpation: Tenderness midline cervical spine and thoracic spine left sided paraspinals and rhomboids  ROM: AROM of C Spine intact  Strength: 5/5 strength in bilateral upper extremities  Reflexes: 2+ biceps reflex bilaterally, 2+ brachioradialis reflex bilaterally, negative Goyal bilaterally  Sensation: Intact to light touch in bilateral upper extremities Special tests: Spurling's negative bilaterally.

## 2024-04-03 ENCOUNTER — APPOINTMENT (OUTPATIENT)
Dept: PEDIATRIC ALLERGY IMMUNOLOGY | Facility: CLINIC | Age: 49
End: 2024-04-03

## 2024-04-09 ENCOUNTER — NON-APPOINTMENT (OUTPATIENT)
Age: 49
End: 2024-04-09

## 2024-04-09 PROBLEM — E78.5 HLD (HYPERLIPIDEMIA): Status: ACTIVE | Noted: 2019-09-19

## 2024-04-09 PROBLEM — E78.1 HYPERTRIGLYCERIDEMIA: Status: ACTIVE | Noted: 2020-05-19

## 2024-04-16 ENCOUNTER — APPOINTMENT (OUTPATIENT)
Dept: CARDIOLOGY | Facility: CLINIC | Age: 49
End: 2024-04-16

## 2024-04-16 DIAGNOSIS — E78.5 HYPERLIPIDEMIA, UNSPECIFIED: ICD-10-CM

## 2024-04-16 DIAGNOSIS — E78.1 PURE HYPERGLYCERIDEMIA: ICD-10-CM

## 2024-04-17 ENCOUNTER — APPOINTMENT (OUTPATIENT)
Dept: NEUROLOGY | Facility: CLINIC | Age: 49
End: 2024-04-17

## 2024-04-29 RX ORDER — OMEPRAZOLE 40 MG/1
40 CAPSULE, DELAYED RELEASE ORAL
Qty: 180 | Refills: 0 | Status: ACTIVE | COMMUNITY
Start: 2021-10-16 | End: 1900-01-01

## 2024-04-29 RX ORDER — VALACYCLOVIR 500 MG/1
500 TABLET, FILM COATED ORAL DAILY
Qty: 90 | Refills: 1 | Status: ACTIVE | COMMUNITY
Start: 2020-05-19 | End: 1900-01-01

## 2024-05-01 RX ORDER — DIVALPROEX SODIUM 500 1/1
500 TABLET, EXTENDED RELEASE ORAL
Qty: 180 | Refills: 3 | Status: ACTIVE | COMMUNITY
Start: 2024-02-28 | End: 1900-01-01

## 2024-05-16 ENCOUNTER — NON-APPOINTMENT (OUTPATIENT)
Age: 49
End: 2024-05-16

## 2024-05-17 DIAGNOSIS — G50.0 TRIGEMINAL NEURALGIA: ICD-10-CM

## 2024-05-17 DIAGNOSIS — R20.8 OTHER DISTURBANCES OF SKIN SENSATION: ICD-10-CM

## 2024-05-22 ENCOUNTER — APPOINTMENT (OUTPATIENT)
Dept: PEDIATRIC ALLERGY IMMUNOLOGY | Facility: CLINIC | Age: 49
End: 2024-05-22
Payer: COMMERCIAL

## 2024-05-22 ENCOUNTER — NON-APPOINTMENT (OUTPATIENT)
Age: 49
End: 2024-05-22

## 2024-05-22 VITALS
HEART RATE: 76 BPM | TEMPERATURE: 97.7 F | SYSTOLIC BLOOD PRESSURE: 114 MMHG | OXYGEN SATURATION: 97 % | DIASTOLIC BLOOD PRESSURE: 76 MMHG

## 2024-05-22 DIAGNOSIS — L50.9 URTICARIA, UNSPECIFIED: ICD-10-CM

## 2024-05-22 DIAGNOSIS — R20.2 PARESTHESIA OF SKIN: ICD-10-CM

## 2024-05-22 DIAGNOSIS — K14.6 GLOSSODYNIA: ICD-10-CM

## 2024-05-22 DIAGNOSIS — J30.9 ALLERGIC RHINITIS, UNSPECIFIED: ICD-10-CM

## 2024-05-22 DIAGNOSIS — Z88.9 ALLERGY STATUS TO UNSPECIFIED DRUGS, MEDICAMENTS AND BIOLOGICAL SUBSTANCES: ICD-10-CM

## 2024-05-22 PROCEDURE — 99214 OFFICE O/P EST MOD 30 MIN: CPT

## 2024-05-22 NOTE — REASON FOR VISIT
[Routine Follow-Up] : a routine follow-up visit for [FreeTextEntry3] : burning dry mouth [Parent] : parent

## 2024-05-22 NOTE — PHYSICAL EXAM
[Alert] : alert [No Acute Distress] : no acute distress [Normal Voice/Communication] : normal voice communication [Supple] : the neck was supple [Normal S1, S2] : normal S1 and S2 [No murmur] : no murmur [Regular Rhythm] : with a regular rhythm [Soft] : abdomen soft [No HSM] : no hepato-splenomegaly [Normal Cervical Lymph Nodes] : cervical [Skin Intact] : skin intact  [No Rash] : no rash [No clubbing] : no clubbing [No Cyanosis] : no cyanosis [Alert, Awake, Oriented as Age-Appropriate] : alert, awake, oriented as age appropriate [de-identified] : Appears slightly anxious. [de-identified] : Eyes clear. [de-identified] : Throat clear, no mouth dryness noted, no mouth lesions.  Nasal mucosa pink, mild stuffy nose, no discharge.  Tympanic membranes normal.  No sinus tenderness. [de-identified] : Chest clear, good air entry, no wheezing or crackles.

## 2024-05-22 NOTE — ASSESSMENT
[FreeTextEntry1] : Burning mouth syndrome: Blood work for vitamin and mineral deficiency.  Also blood work for food allergies.  Blood work for chemistry, CBC with differential, thyroid. History of anaphylaxis to Bactrim, history of urticaria with Zithromax, Macrobid, and Keflex. History of recurrent urticaria for several months last year.: Blood work for serum tryptase. Allergic rhinitis (dust mites): Unable to prescribe antihistamine because it will aggravate the dryness of the mouth. History of possible reaction to Praluent, delayed: Will return for skin testing when feeling better.   Neuropathic pain: Advised to consult with neurologist.  Proceed with nerve conduction studies. Follow-up with blood work.

## 2024-05-22 NOTE — SOCIAL HISTORY
[de-identified] : House with gas baseboard heating, central air conditioning, no carpet in the bedroom, 2 cats.  Smoking half pack per day from age 14.

## 2024-06-04 ENCOUNTER — OUTPATIENT (OUTPATIENT)
Dept: OUTPATIENT SERVICES | Facility: HOSPITAL | Age: 49
LOS: 1 days | End: 2024-06-04
Payer: COMMERCIAL

## 2024-06-04 ENCOUNTER — APPOINTMENT (OUTPATIENT)
Dept: MRI IMAGING | Facility: CLINIC | Age: 49
End: 2024-06-04
Payer: COMMERCIAL

## 2024-06-04 DIAGNOSIS — R20.8 OTHER DISTURBANCES OF SKIN SENSATION: ICD-10-CM

## 2024-06-04 DIAGNOSIS — G50.0 TRIGEMINAL NEURALGIA: ICD-10-CM

## 2024-06-04 PROCEDURE — 70553 MRI BRAIN STEM W/O & W/DYE: CPT

## 2024-06-04 PROCEDURE — A9585: CPT

## 2024-06-04 PROCEDURE — 70553 MRI BRAIN STEM W/O & W/DYE: CPT | Mod: 26

## 2024-06-05 ENCOUNTER — LABORATORY RESULT (OUTPATIENT)
Age: 49
End: 2024-06-05

## 2024-06-05 ENCOUNTER — TRANSCRIPTION ENCOUNTER (OUTPATIENT)
Age: 49
End: 2024-06-05

## 2024-06-06 ENCOUNTER — NON-APPOINTMENT (OUTPATIENT)
Age: 49
End: 2024-06-06

## 2024-06-10 ENCOUNTER — NON-APPOINTMENT (OUTPATIENT)
Age: 49
End: 2024-06-10

## 2024-06-11 ENCOUNTER — NON-APPOINTMENT (OUTPATIENT)
Age: 49
End: 2024-06-11

## 2024-06-11 DIAGNOSIS — Z13.29 ENCOUNTER FOR SCREENING FOR OTHER SUSPECTED ENDOCRINE DISORDER: ICD-10-CM

## 2024-06-12 ENCOUNTER — APPOINTMENT (OUTPATIENT)
Dept: NEUROLOGY | Facility: CLINIC | Age: 49
End: 2024-06-12

## 2024-06-12 ENCOUNTER — NON-APPOINTMENT (OUTPATIENT)
Age: 49
End: 2024-06-12

## 2024-06-12 RX ORDER — EVOLOCUMAB 140 MG/ML
140 INJECTION, SOLUTION SUBCUTANEOUS
Qty: 2 | Refills: 5 | Status: DISCONTINUED | COMMUNITY
Start: 2023-07-21 | End: 2024-06-12

## 2024-06-12 RX ORDER — SULFAMETHOXAZOLE AND TRIMETHOPRIM 200; 40 MG/5ML; MG/5ML
200-40 SUSPENSION ORAL TWICE DAILY
Qty: 1 | Refills: 0 | Status: DISCONTINUED | COMMUNITY
Start: 2023-07-26 | End: 2024-06-12

## 2024-06-12 RX ORDER — MELOXICAM 7.5 MG/1
7.5 TABLET ORAL
Qty: 28 | Refills: 0 | Status: DISCONTINUED | COMMUNITY
Start: 2023-07-20 | End: 2024-06-12

## 2024-06-12 RX ORDER — FAMOTIDINE 20 MG/1
20 TABLET, FILM COATED ORAL
Qty: 90 | Refills: 1 | Status: DISCONTINUED | COMMUNITY
Start: 2021-10-16 | End: 2024-06-12

## 2024-06-12 RX ORDER — ALBUTEROL SULFATE 90 UG/1
108 (90 BASE) INHALANT RESPIRATORY (INHALATION)
Qty: 1 | Refills: 0 | Status: DISCONTINUED | COMMUNITY
Start: 2020-01-13 | End: 2024-06-12

## 2024-06-12 RX ORDER — CIPROFLOXACIN HYDROCHLORIDE 250 MG/1
250 TABLET, FILM COATED ORAL
Qty: 6 | Refills: 1 | Status: DISCONTINUED | COMMUNITY
Start: 2023-07-27 | End: 2024-06-12

## 2024-06-12 RX ORDER — FLUTICASONE FUROATE AND VILANTEROL TRIFENATATE 100; 25 UG/1; UG/1
100-25 POWDER RESPIRATORY (INHALATION)
Qty: 1 | Refills: 3 | Status: DISCONTINUED | COMMUNITY
Start: 2022-08-16 | End: 2024-06-12

## 2024-06-13 ENCOUNTER — NON-APPOINTMENT (OUTPATIENT)
Age: 49
End: 2024-06-13

## 2024-06-14 ENCOUNTER — NON-APPOINTMENT (OUTPATIENT)
Age: 49
End: 2024-06-14
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
## 2024-06-18 NOTE — HISTORY OF PRESENT ILLNESS
[Right Handed] : right handed [Family History of Seizures] : family history of seizures [Head Trauma] : head trauma [Divalproex (Depakote)] : divalproex (Depakote) [Grand Mal Status Epilepticus] : no [] : no [Lesional] : nonlesional [ Complications] : ~T no  complications [Meningitis or Encephalitis] : no meningitis or encephalitis [Developmental Delay] : no developmental delay [Stroke] : no stroke  [FreeTextEntry1] : staring spells since age 3-5yo [FreeTextEntry3] : Age 3-4 staring spells.  Went to ER once at that time, unrevealing.\par  As child, may have had intermittent staring spells a few times a year.\par  Around age 15 starting getting jerks when sleep deprived, might drop objects. Some confusion and staring spells  to 2-3 min.  Happening nearly daily at one point.  One day upon arousal, had episode of frequent twitching, and drops, fell down stairs.  Saw neurologist at that time at [facility], then Dr Paco Zepeda at [facility].  Was placed on VPA for years with very good control. \par  No GTCS ever. [de-identified] : hit head around age 3, pool table fell on her. GMo with GTC.  MCou with absence.

## 2024-06-18 NOTE — CONSULT LETTER
[Dear  ___] : Dear  [unfilled], [Consult Letter:] : I had the pleasure of evaluating your patient, [unfilled]. [( Thank you for referring [unfilled] for consultation for _____ )] : Thank you for referring [unfilled] for consultation for [unfilled] [Please see my note below.] : Please see my note below. [Consult Closing:] : Thank you very much for allowing me to participate in the care of this patient.  If you have any questions, please do not hesitate to contact me. [Sincerely,] : Sincerely, [Yosvany Griffiths MD] : Yosvany Griffiths MD [Attending, Dept. of Neurology, Division of Epilepsy] : Attending, Dept. of Neurology, Division of Epilepsy [[facility]] : [facility] [ of Neurology] :  of Neurology [FreeTextEntry2] : Tiara Miner MD

## 2024-06-18 NOTE — DATA REVIEWED
[de-identified] : 06/04/2024 MRI Brain and IAC w/w/o gado No evidence of vestibular schwannoma.  No evidence for acute territorial infarct, acute intracranial hemorrhage, or midline shift. [id number] Apparent small area of volume loss/possible encephalomalacia in the left occipital lobe. Small WM change in right frontal region. [de-identified] : 03/02/2024  MRI C spine, T spine Mild cervical and minimal thoracic spondylosis as described above. There is a rounded hemangioma within the C4 vertebra measuring 0.6 cm. Additional hemangioma within the T9 vertebra on the left measuring 1.6 cm. 07/29/2023 MRI L spine  Dextroscoliosis with lower lumbar spondylosis, as above. Mild right foraminal narrowing at L4-L5 and moderate right foraminal narrowing at L5-S1. No spinal canal narrowing at any level.

## 2024-06-27 ENCOUNTER — APPOINTMENT (OUTPATIENT)
Dept: NEUROLOGY | Facility: CLINIC | Age: 49
End: 2024-06-27
Payer: COMMERCIAL

## 2024-06-27 PROCEDURE — 95886 MUSC TEST DONE W/N TEST COMP: CPT

## 2024-06-27 PROCEDURE — 95910 NRV CNDJ TEST 7-8 STUDIES: CPT

## 2024-07-02 ENCOUNTER — APPOINTMENT (OUTPATIENT)
Age: 49
End: 2024-07-02

## 2024-07-11 ENCOUNTER — APPOINTMENT (OUTPATIENT)
Dept: FAMILY MEDICINE | Facility: CLINIC | Age: 49
End: 2024-07-11

## 2024-07-12 ENCOUNTER — APPOINTMENT (OUTPATIENT)
Dept: NEUROLOGY | Facility: CLINIC | Age: 49
End: 2024-07-12

## 2024-07-24 ENCOUNTER — APPOINTMENT (OUTPATIENT)
Dept: NEUROLOGY | Facility: CLINIC | Age: 49
End: 2024-07-24
Payer: COMMERCIAL

## 2024-07-24 DIAGNOSIS — G40.909 EPILEPSY, UNSPECIFIED, NOT INTRACTABLE, W/OUT STATUS EPILEPTICUS: ICD-10-CM

## 2024-07-24 DIAGNOSIS — Z51.81 ENCOUNTER FOR THERAPEUTIC DRUG LVL MONITORING: ICD-10-CM

## 2024-07-24 PROCEDURE — 99214 OFFICE O/P EST MOD 30 MIN: CPT | Mod: 95

## 2024-07-24 PROCEDURE — G2211 COMPLEX E/M VISIT ADD ON: CPT | Mod: NC

## 2024-07-24 NOTE — DATA REVIEWED
[de-identified] : 6/2021 Apparent small area of volume loss/possible encephalomalacia in the left occipital lobe. Small WM change in right frontal region. [de-identified] : done remotely [de-identified] : 2016 [facility] nl,
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
## 2024-07-24 NOTE — PHYSICAL EXAM
[FreeTextEntry1] : Exam limited via telehealth: MS:  awake, alert, coherent, fluent, comprehension intact, affect stable.  oriented CN: EOMI, face symmetrical, grimace, smile symmetrical, eye closure symmetrical, hearing intact grossly. Motor: moving all 4 extremities freely. Coord: FFM and to screen intact  Walking / Sensation deferred. Cardiac/pulmonary/extremity exam deferred via telehealth.

## 2024-07-24 NOTE — HISTORY OF PRESENT ILLNESS
[Right Handed] : right handed [Family History of Seizures] : family history of seizures [Head Trauma] : head trauma [Divalproex (Depakote)] : divalproex (Depakote) [Grand Mal Status Epilepticus] : no [] : no [Lesional] : nonlesional [ Complications] : ~T no  complications [Meningitis or Encephalitis] : no meningitis or encephalitis [Developmental Delay] : no developmental delay [Stroke] : no stroke  [FreeTextEntry1] : staring spells since age 3-3yo [FreeTextEntry3] : Age 3-4 staring spells.  Went to ER once at that time, unrevealing.\par  As child, may have had intermittent staring spells a few times a year.\par  Around age 15 starting getting jerks when sleep deprived, might drop objects. Some confusion and staring spells  to 2-3 min.  Happening nearly daily at one point.  One day upon arousal, had episode of frequent twitching, and drops, fell down stairs.  Saw neurologist at that time at [facility], then Dr Paco Zepeda at [facility].  Was placed on VPA for years with very good control. \par  No GTCS ever. [de-identified] : hit head around age 3, pool table fell on her. GMo with GTC.  MCou with absence.

## 2024-07-24 NOTE — CONSULT LETTER
[Dear  ___] : Dear  [unfilled], [Consult Letter:] : I had the pleasure of evaluating your patient, [unfilled]. [( Thank you for referring [unfilled] for consultation for _____ )] : Thank you for referring [unfilled] for consultation for [unfilled] [Please see my note below.] : Please see my note below. [Consult Closing:] : Thank you very much for allowing me to participate in the care of this patient.  If you have any questions, please do not hesitate to contact me. [Sincerely,] : Sincerely, [Yosvany Griffiths MD] : Yosvany Griffiths MD [Attending, Dept. of Neurology, Division of Epilepsy] : Attending, Dept. of Neurology, Division of Epilepsy [[facility]] : [facility] [ of Neurology] :  of Neurology [FreeTextEntry2] : Tiara Miner MD

## 2024-07-25 ENCOUNTER — APPOINTMENT (OUTPATIENT)
Dept: PEDIATRIC ALLERGY IMMUNOLOGY | Facility: CLINIC | Age: 49
End: 2024-07-25

## 2024-08-05 ENCOUNTER — APPOINTMENT (OUTPATIENT)
Dept: MAMMOGRAPHY | Facility: CLINIC | Age: 49
End: 2024-08-05

## 2024-08-05 ENCOUNTER — APPOINTMENT (OUTPATIENT)
Dept: ULTRASOUND IMAGING | Facility: CLINIC | Age: 49
End: 2024-08-05

## 2024-09-12 ENCOUNTER — APPOINTMENT (OUTPATIENT)
Dept: FAMILY MEDICINE | Facility: CLINIC | Age: 49
End: 2024-09-12
Payer: COMMERCIAL

## 2024-09-12 VITALS
HEIGHT: 66 IN | TEMPERATURE: 98.2 F | BODY MASS INDEX: 24.11 KG/M2 | SYSTOLIC BLOOD PRESSURE: 114 MMHG | HEART RATE: 68 BPM | WEIGHT: 150 LBS | OXYGEN SATURATION: 98 % | DIASTOLIC BLOOD PRESSURE: 70 MMHG

## 2024-09-12 DIAGNOSIS — Z00.00 ENCOUNTER FOR GENERAL ADULT MEDICAL EXAMINATION W/OUT ABNORMAL FINDINGS: ICD-10-CM

## 2024-09-12 DIAGNOSIS — E78.1 PURE HYPERGLYCERIDEMIA: ICD-10-CM

## 2024-09-12 DIAGNOSIS — J45.40 MODERATE PERSISTENT ASTHMA, UNCOMPLICATED: ICD-10-CM

## 2024-09-12 DIAGNOSIS — E78.5 HYPERLIPIDEMIA, UNSPECIFIED: ICD-10-CM

## 2024-09-12 DIAGNOSIS — R20.2 PARESTHESIA OF SKIN: ICD-10-CM

## 2024-09-12 DIAGNOSIS — K13.79 OTHER LESIONS OF ORAL MUCOSA: ICD-10-CM

## 2024-09-12 PROCEDURE — 99396 PREV VISIT EST AGE 40-64: CPT

## 2024-09-12 PROCEDURE — 99214 OFFICE O/P EST MOD 30 MIN: CPT | Mod: 25

## 2024-09-12 NOTE — PLAN
[FreeTextEntry1] : Paresthesia's - possibly Sjogren's/rheumatological etiology vs psychosomatic etiology - recommend f/u with rheum - will refer to behavioral health. Strongly encouraged to f/u with rheum as well as psych and therapy  mouth sores - likely hsv - likely due to stress   Anxiety/depression - on zoloft, however pt needs further management - behavioral health referral  HLD - f/u lipid panel - cardio f/u - will consider repatha  HCM - f/u labs - healthy diet and exercise - mammo UTD Detail Level: Generalized

## 2024-09-12 NOTE — HEALTH RISK ASSESSMENT
[Current] : Current [No] : In the past 12 months have you used drugs other than those required for medical reasons? No [Patient reported mammogram was normal] : Patient reported mammogram was normal [de-identified] : limited  [de-identified] : balanced [MammogramDate] : 03/24

## 2024-09-12 NOTE — HISTORY OF PRESENT ILLNESS
[FreeTextEntry1] : CPE [de-identified] : 49 year old female presents for CPE. She states she has had a difficult year.  Was on praluent but had bad reaction so stopped. She had been having nerve and leg pain last summer associated with anxiety. Her cat  in March and she had another episode of this. Then in May which was the anniversary of her brother's death, she had this episode happen again but also associated with dry mouth, sinus pressure and sores in mouth. That resolved but then had pins and needles returned. She did see rheumatology who believes she may have Sjogren's and was told she needs to have a lip biopsy. She has not yet returned as she is anxious something is wrong.Once she has a physical ailment she downward spirals and cannot get out of it. She is also concerned about her cholesterol. Was advised to start repatha but concerned due to her previous reaction to praluent

## 2024-09-17 DIAGNOSIS — G57.93 UNSPECIFIED MONONEUROPATHY OF BILATERAL LOWER LIMBS: ICD-10-CM

## 2024-09-30 ENCOUNTER — TRANSCRIPTION ENCOUNTER (OUTPATIENT)
Age: 49
End: 2024-09-30

## 2024-10-02 ENCOUNTER — APPOINTMENT (OUTPATIENT)
Dept: NEUROLOGY | Facility: CLINIC | Age: 49
End: 2024-10-02
Payer: COMMERCIAL

## 2024-10-02 DIAGNOSIS — M54.16 RADICULOPATHY, LUMBAR REGION: ICD-10-CM

## 2024-10-02 DIAGNOSIS — G57.93 UNSPECIFIED MONONEUROPATHY OF BILATERAL LOWER LIMBS: ICD-10-CM

## 2024-10-02 DIAGNOSIS — R20.2 PARESTHESIA OF SKIN: ICD-10-CM

## 2024-10-02 DIAGNOSIS — G40.909 EPILEPSY, UNSPECIFIED, NOT INTRACTABLE, W/OUT STATUS EPILEPTICUS: ICD-10-CM

## 2024-10-02 DIAGNOSIS — F41.9 ANXIETY DISORDER, UNSPECIFIED: ICD-10-CM

## 2024-10-02 DIAGNOSIS — F32.A ANXIETY DISORDER, UNSPECIFIED: ICD-10-CM

## 2024-10-02 PROCEDURE — 99214 OFFICE O/P EST MOD 30 MIN: CPT | Mod: 95

## 2024-10-02 PROCEDURE — G2211 COMPLEX E/M VISIT ADD ON: CPT | Mod: NC
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
## 2024-10-02 NOTE — CONSULT LETTER
[Dear  ___] : Dear  [unfilled], [Consult Letter:] : I had the pleasure of evaluating your patient, [unfilled]. [( Thank you for referring [unfilled] for consultation for _____ )] : Thank you for referring [unfilled] for consultation for [unfilled] [Please see my note below.] : Please see my note below. [Consult Closing:] : Thank you very much for allowing me to participate in the care of this patient.  If you have any questions, please do not hesitate to contact me. [Sincerely,] : Sincerely, [Yosvany Grfifiths MD] : Yosvany Griffiths MD [Attending, Dept. of Neurology, Division of Epilepsy] : Attending, Dept. of Neurology, Division of Epilepsy [[facility]] : [facility] [ of Neurology] :  of Neurology [FreeTextEntry2] : Tiara Miner MD

## 2024-10-02 NOTE — DATA REVIEWED
[de-identified] : 6/2021 Apparent small area of volume loss/possible encephalomalacia in the left occipital lobe. Small WM change in right frontal region. [de-identified] : done remotely [de-identified] : 2016 [facility] nl,

## 2024-10-02 NOTE — HISTORY OF PRESENT ILLNESS
[Right Handed] : right handed [Family History of Seizures] : family history of seizures [Head Trauma] : head trauma [Divalproex (Depakote)] : divalproex (Depakote) [Grand Mal Status Epilepticus] : no [] : no [Lesional] : nonlesional [ Complications] : ~T no  complications [Meningitis or Encephalitis] : no meningitis or encephalitis [Developmental Delay] : no developmental delay [Stroke] : no stroke  [FreeTextEntry1] : staring spells since age 3-5yo [FreeTextEntry3] : Age 3-4 staring spells.  Went to ER once at that time, unrevealing.\par  As child, may have had intermittent staring spells a few times a year.\par  Around age 15 starting getting jerks when sleep deprived, might drop objects. Some confusion and staring spells  to 2-3 min.  Happening nearly daily at one point.  One day upon arousal, had episode of frequent twitching, and drops, fell down stairs.  Saw neurologist at that time at [facility], then Dr Paco Zepeda at [facility].  Was placed on VPA for years with very good control. \par  No GTCS ever. [de-identified] : hit head around age 3, pool table fell on her. GMo with GTC.  MCou with absence.

## 2024-10-15 ENCOUNTER — APPOINTMENT (OUTPATIENT)
Dept: PEDIATRIC ALLERGY IMMUNOLOGY | Facility: CLINIC | Age: 49
End: 2024-10-15
Payer: COMMERCIAL

## 2024-10-15 ENCOUNTER — NON-APPOINTMENT (OUTPATIENT)
Age: 49
End: 2024-10-15

## 2024-10-15 VITALS
TEMPERATURE: 97.7 F | DIASTOLIC BLOOD PRESSURE: 60 MMHG | SYSTOLIC BLOOD PRESSURE: 96 MMHG | OXYGEN SATURATION: 98 % | HEART RATE: 76 BPM

## 2024-10-15 DIAGNOSIS — K14.6 GLOSSODYNIA: ICD-10-CM

## 2024-10-15 DIAGNOSIS — K13.79 OTHER LESIONS OF ORAL MUCOSA: ICD-10-CM

## 2024-10-15 DIAGNOSIS — G57.93 UNSPECIFIED MONONEUROPATHY OF BILATERAL LOWER LIMBS: ICD-10-CM

## 2024-10-15 DIAGNOSIS — E54 ASCORBIC ACID DEFICIENCY: ICD-10-CM

## 2024-10-15 DIAGNOSIS — F17.210 NICOTINE DEPENDENCE, CIGARETTES, UNCOMPLICATED: ICD-10-CM

## 2024-10-15 DIAGNOSIS — Z88.9 ALLERGY STATUS TO UNSPECIFIED DRUGS, MEDICAMENTS AND BIOLOGICAL SUBSTANCES: ICD-10-CM

## 2024-10-15 PROCEDURE — 99214 OFFICE O/P EST MOD 30 MIN: CPT

## 2024-10-21 ENCOUNTER — APPOINTMENT (OUTPATIENT)
Dept: ULTRASOUND IMAGING | Facility: CLINIC | Age: 49
End: 2024-10-21

## 2024-10-21 ENCOUNTER — APPOINTMENT (OUTPATIENT)
Dept: MAMMOGRAPHY | Facility: CLINIC | Age: 49
End: 2024-10-21

## 2024-10-22 ENCOUNTER — APPOINTMENT (OUTPATIENT)
Age: 49
End: 2024-10-22

## 2024-10-29 ENCOUNTER — APPOINTMENT (OUTPATIENT)
Dept: NEUROLOGY | Facility: CLINIC | Age: 49
End: 2024-10-29
Payer: COMMERCIAL

## 2024-10-29 VITALS
WEIGHT: 152 LBS | HEIGHT: 66 IN | BODY MASS INDEX: 24.43 KG/M2 | DIASTOLIC BLOOD PRESSURE: 65 MMHG | SYSTOLIC BLOOD PRESSURE: 116 MMHG | HEART RATE: 76 BPM

## 2024-10-29 DIAGNOSIS — R20.8 OTHER DISTURBANCES OF SKIN SENSATION: ICD-10-CM

## 2024-10-29 PROCEDURE — 11105 PUNCH BX SKIN EA SEP/ADDL: CPT

## 2024-10-29 PROCEDURE — 11104 PUNCH BX SKIN SINGLE LESION: CPT

## 2024-12-11 ENCOUNTER — NON-APPOINTMENT (OUTPATIENT)
Age: 49
End: 2024-12-11

## 2024-12-15 PROBLEM — Z03.818 ENCOUNTER FOR PATIENT CONCERN ABOUT EXPOSURE TO INFECTIOUS ORGANISM: Status: ACTIVE | Noted: 2024-12-15

## 2024-12-15 PROBLEM — Z71.1 CONCERN ABOUT INFECTIOUS DISEASE WITHOUT DIAGNOSIS: Status: ACTIVE | Noted: 2024-12-15

## 2024-12-16 ENCOUNTER — LABORATORY RESULT (OUTPATIENT)
Age: 49
End: 2024-12-16

## 2024-12-16 ENCOUNTER — APPOINTMENT (OUTPATIENT)
Dept: INFECTIOUS DISEASE | Facility: CLINIC | Age: 49
End: 2024-12-16
Payer: COMMERCIAL

## 2024-12-16 VITALS
SYSTOLIC BLOOD PRESSURE: 104 MMHG | DIASTOLIC BLOOD PRESSURE: 48 MMHG | BODY MASS INDEX: 24.59 KG/M2 | WEIGHT: 153 LBS | OXYGEN SATURATION: 95 % | HEIGHT: 66 IN | HEART RATE: 72 BPM

## 2024-12-16 DIAGNOSIS — T14.8XXA OTHER INJURY OF UNSPECIFIED BODY REGION, INITIAL ENCOUNTER: ICD-10-CM

## 2024-12-16 DIAGNOSIS — B00.9 HERPESVIRAL INFECTION, UNSPECIFIED: ICD-10-CM

## 2024-12-16 DIAGNOSIS — K14.6 GLOSSODYNIA: ICD-10-CM

## 2024-12-16 DIAGNOSIS — R20.2 PARESTHESIA OF SKIN: ICD-10-CM

## 2024-12-16 DIAGNOSIS — K12.0 RECURRENT ORAL APHTHAE: ICD-10-CM

## 2024-12-16 DIAGNOSIS — Z71.1 PERSON WITH FEARED HEALTH COMPLAINT IN WHOM NO DIAGNOSIS IS MADE: ICD-10-CM

## 2024-12-16 DIAGNOSIS — Z03.818 ENCOUNTER FOR OBSERVATION FOR SUSPECTED EXPOSURE TO OTHER BIOLOGICAL AGENTS RULED OUT: ICD-10-CM

## 2024-12-16 PROCEDURE — 99205 OFFICE O/P NEW HI 60 MIN: CPT

## 2024-12-16 RX ORDER — CHLORHEXIDINE GLUCONATE, 0.12% ORAL RINSE 1.2 MG/ML
0.12 SOLUTION DENTAL
Qty: 2 | Refills: 1 | Status: ACTIVE | COMMUNITY
Start: 2024-12-16 | End: 1900-01-01

## 2024-12-16 RX ORDER — TRIAMCINOLONE ACETONIDE 1 MG/G
0.1 PASTE DENTAL 4 TIMES DAILY
Qty: 1 | Refills: 2 | Status: ACTIVE | COMMUNITY
Start: 2024-12-16 | End: 1900-01-01

## 2024-12-17 ENCOUNTER — NON-APPOINTMENT (OUTPATIENT)
Age: 49
End: 2024-12-17

## 2024-12-17 ENCOUNTER — TRANSCRIPTION ENCOUNTER (OUTPATIENT)
Age: 49
End: 2024-12-17

## 2024-12-17 LAB
CRP SERPL-MCNC: <3 MG/L
ERYTHROCYTE [SEDIMENTATION RATE] IN BLOOD BY WESTERGREN METHOD: 9 MM/HR
HIV1+2 AB SPEC QL IA.RAPID: NONREACTIVE

## 2024-12-18 ENCOUNTER — NON-APPOINTMENT (OUTPATIENT)
Age: 49
End: 2024-12-18

## 2024-12-19 ENCOUNTER — NON-APPOINTMENT (OUTPATIENT)
Age: 49
End: 2024-12-19

## 2024-12-20 ENCOUNTER — TRANSCRIPTION ENCOUNTER (OUTPATIENT)
Age: 49
End: 2024-12-20

## 2024-12-20 ENCOUNTER — APPOINTMENT (OUTPATIENT)
Dept: VASCULAR SURGERY | Facility: CLINIC | Age: 49
End: 2024-12-20

## 2024-12-20 ENCOUNTER — APPOINTMENT (OUTPATIENT)
Dept: VASCULAR SURGERY | Facility: CLINIC | Age: 49
End: 2024-12-20
Payer: COMMERCIAL

## 2024-12-20 VITALS
SYSTOLIC BLOOD PRESSURE: 106 MMHG | OXYGEN SATURATION: 96 % | WEIGHT: 153 LBS | BODY MASS INDEX: 24.59 KG/M2 | RESPIRATION RATE: 16 BRPM | DIASTOLIC BLOOD PRESSURE: 50 MMHG | HEIGHT: 66 IN | HEART RATE: 80 BPM

## 2024-12-20 DIAGNOSIS — M79.606 PAIN IN LEG, UNSPECIFIED: ICD-10-CM

## 2024-12-20 LAB — HLA-B27 QL NAA+PROBE: NORMAL

## 2024-12-20 PROCEDURE — 93970 EXTREMITY STUDY: CPT

## 2024-12-20 PROCEDURE — 99204 OFFICE O/P NEW MOD 45 MIN: CPT

## 2024-12-20 PROCEDURE — 93925 LOWER EXTREMITY STUDY: CPT

## 2024-12-20 RX ORDER — DIVALPROEX SODIUM 250 MG/1
250 TABLET, DELAYED RELEASE ORAL
Refills: 0 | Status: ACTIVE | COMMUNITY

## 2024-12-23 ENCOUNTER — NON-APPOINTMENT (OUTPATIENT)
Age: 49
End: 2024-12-23

## 2024-12-23 PROBLEM — M79.606 LEG PAIN: Status: ACTIVE | Noted: 2024-12-23

## 2025-01-16 ENCOUNTER — APPOINTMENT (OUTPATIENT)
Dept: OPHTHALMOLOGY | Facility: CLINIC | Age: 50
End: 2025-01-16

## 2025-01-31 ENCOUNTER — APPOINTMENT (OUTPATIENT)
Dept: ULTRASOUND IMAGING | Facility: CLINIC | Age: 50
End: 2025-01-31

## 2025-01-31 ENCOUNTER — APPOINTMENT (OUTPATIENT)
Dept: MAMMOGRAPHY | Facility: CLINIC | Age: 50
End: 2025-01-31

## 2025-02-13 ENCOUNTER — RX RENEWAL (OUTPATIENT)
Age: 50
End: 2025-02-13

## 2025-03-07 ENCOUNTER — NON-APPOINTMENT (OUTPATIENT)
Age: 50
End: 2025-03-07

## 2025-03-10 ENCOUNTER — APPOINTMENT (OUTPATIENT)
Dept: INFECTIOUS DISEASE | Facility: CLINIC | Age: 50
End: 2025-03-10

## 2025-03-11 ENCOUNTER — NON-APPOINTMENT (OUTPATIENT)
Age: 50
End: 2025-03-11

## 2025-03-11 ENCOUNTER — APPOINTMENT (OUTPATIENT)
Dept: OPHTHALMOLOGY | Facility: CLINIC | Age: 50
End: 2025-03-11
Payer: COMMERCIAL

## 2025-03-11 PROCEDURE — 92004 COMPRE OPH EXAM NEW PT 1/>: CPT

## 2025-03-11 PROCEDURE — 92283 EXTND COLOR VISION XM: CPT

## 2025-03-14 ENCOUNTER — APPOINTMENT (OUTPATIENT)
Dept: NEUROLOGY | Facility: CLINIC | Age: 50
End: 2025-03-14
Payer: COMMERCIAL

## 2025-03-14 DIAGNOSIS — R20.2 PARESTHESIA OF SKIN: ICD-10-CM

## 2025-03-14 DIAGNOSIS — G40.909 EPILEPSY, UNSPECIFIED, NOT INTRACTABLE, W/OUT STATUS EPILEPTICUS: ICD-10-CM

## 2025-03-14 DIAGNOSIS — G43.809 OTHER MIGRAINE, NOT INTRACTABLE, W/OUT STATUS MIGRAINOSUS: ICD-10-CM

## 2025-03-14 PROCEDURE — 99214 OFFICE O/P EST MOD 30 MIN: CPT | Mod: 95

## 2025-03-18 ENCOUNTER — TRANSCRIPTION ENCOUNTER (OUTPATIENT)
Age: 50
End: 2025-03-18

## 2025-03-18 DIAGNOSIS — G47.9 SLEEP DISORDER, UNSPECIFIED: ICD-10-CM

## 2025-03-19 ENCOUNTER — NON-APPOINTMENT (OUTPATIENT)
Age: 50
End: 2025-03-19

## 2025-03-20 ENCOUNTER — APPOINTMENT (OUTPATIENT)
Dept: OPHTHALMOLOGY | Facility: CLINIC | Age: 50
End: 2025-03-20

## 2025-03-24 ENCOUNTER — APPOINTMENT (OUTPATIENT)
Dept: INFECTIOUS DISEASE | Facility: CLINIC | Age: 50
End: 2025-03-24

## 2025-03-24 DIAGNOSIS — Z71.1 PERSON WITH FEARED HEALTH COMPLAINT IN WHOM NO DIAGNOSIS IS MADE: ICD-10-CM

## 2025-03-24 DIAGNOSIS — Z03.818 ENCOUNTER FOR OBSERVATION FOR SUSPECTED EXPOSURE TO OTHER BIOLOGICAL AGENTS RULED OUT: ICD-10-CM

## 2025-03-24 DIAGNOSIS — T14.8XXA OTHER INJURY OF UNSPECIFIED BODY REGION, INITIAL ENCOUNTER: ICD-10-CM

## 2025-03-24 DIAGNOSIS — K14.6 GLOSSODYNIA: ICD-10-CM

## 2025-03-24 DIAGNOSIS — K12.0 RECURRENT ORAL APHTHAE: ICD-10-CM

## 2025-03-28 ENCOUNTER — TRANSCRIPTION ENCOUNTER (OUTPATIENT)
Age: 50
End: 2025-03-28

## 2025-04-04 ENCOUNTER — APPOINTMENT (OUTPATIENT)
Dept: MRI IMAGING | Facility: CLINIC | Age: 50
End: 2025-04-04

## 2025-04-04 ENCOUNTER — OUTPATIENT (OUTPATIENT)
Dept: OUTPATIENT SERVICES | Facility: HOSPITAL | Age: 50
LOS: 1 days | End: 2025-04-04
Payer: COMMERCIAL

## 2025-04-04 DIAGNOSIS — K14.6 GLOSSODYNIA: ICD-10-CM

## 2025-04-04 DIAGNOSIS — G40.909 EPILEPSY, UNSPECIFIED, NOT INTRACTABLE, WITHOUT STATUS EPILEPTICUS: ICD-10-CM

## 2025-04-04 DIAGNOSIS — G43.809 OTHER MIGRAINE, NOT INTRACTABLE, WITHOUT STATUS MIGRAINOSUS: ICD-10-CM

## 2025-04-04 PROCEDURE — 70546 MR ANGIOGRAPH HEAD W/O&W/DYE: CPT | Mod: 26,59

## 2025-04-04 PROCEDURE — 70553 MRI BRAIN STEM W/O & W/DYE: CPT | Mod: 26

## 2025-04-04 PROCEDURE — 70545 MR ANGIOGRAPHY HEAD W/DYE: CPT

## 2025-04-04 PROCEDURE — 70553 MRI BRAIN STEM W/O & W/DYE: CPT

## 2025-04-04 PROCEDURE — A9585: CPT

## 2025-04-07 ENCOUNTER — TRANSCRIPTION ENCOUNTER (OUTPATIENT)
Age: 50
End: 2025-04-07

## 2025-04-10 ENCOUNTER — APPOINTMENT (OUTPATIENT)
Dept: MAMMOGRAPHY | Facility: CLINIC | Age: 50
End: 2025-04-10

## 2025-04-10 ENCOUNTER — APPOINTMENT (OUTPATIENT)
Dept: ULTRASOUND IMAGING | Facility: CLINIC | Age: 50
End: 2025-04-10

## 2025-05-02 ENCOUNTER — APPOINTMENT (OUTPATIENT)
Dept: INTERNAL MEDICINE | Facility: CLINIC | Age: 50
End: 2025-05-02

## 2025-05-13 ENCOUNTER — APPOINTMENT (OUTPATIENT)
Dept: INFECTIOUS DISEASE | Facility: CLINIC | Age: 50
End: 2025-05-13

## 2025-05-13 DIAGNOSIS — K12.0 RECURRENT ORAL APHTHAE: ICD-10-CM

## 2025-05-13 DIAGNOSIS — Z71.1 PERSON WITH FEARED HEALTH COMPLAINT IN WHOM NO DIAGNOSIS IS MADE: ICD-10-CM

## 2025-05-20 ENCOUNTER — APPOINTMENT (OUTPATIENT)
Dept: INFECTIOUS DISEASE | Facility: CLINIC | Age: 50
End: 2025-05-20

## 2025-06-02 ENCOUNTER — APPOINTMENT (OUTPATIENT)
Dept: INTERNAL MEDICINE | Facility: CLINIC | Age: 50
End: 2025-06-02

## 2025-06-24 ENCOUNTER — APPOINTMENT (OUTPATIENT)
Dept: NEUROSURGERY | Facility: CLINIC | Age: 50
End: 2025-06-24
Payer: COMMERCIAL

## 2025-06-24 PROCEDURE — 99202 OFFICE O/P NEW SF 15 MIN: CPT | Mod: 95

## 2025-07-07 ENCOUNTER — APPOINTMENT (OUTPATIENT)
Dept: INFECTIOUS DISEASE | Facility: CLINIC | Age: 50
End: 2025-07-07

## 2025-07-08 ENCOUNTER — APPOINTMENT (OUTPATIENT)
Dept: OBGYN | Facility: CLINIC | Age: 50
End: 2025-07-08
Payer: COMMERCIAL

## 2025-07-08 VITALS
HEIGHT: 66 IN | DIASTOLIC BLOOD PRESSURE: 60 MMHG | SYSTOLIC BLOOD PRESSURE: 110 MMHG | BODY MASS INDEX: 26.52 KG/M2 | WEIGHT: 165 LBS

## 2025-07-08 LAB
CARD LOT #: NORMAL
CARD LOT EXP DATE: NORMAL
DATE COLLECTED: NORMAL
DATE COLLECTED: NORMAL
DEVELOPER LOT #: NORMAL
DEVELOPER LOT EXP DATE: NORMAL
HEMOCCULT 2: NEGATIVE
HEMOCCULT SP1 STL QL: NEGATIVE
QUALITY CONTROL: YES
QUALITY CONTROL: YES

## 2025-07-08 PROCEDURE — 99213 OFFICE O/P EST LOW 20 MIN: CPT | Mod: 25

## 2025-07-08 PROCEDURE — 99396 PREV VISIT EST AGE 40-64: CPT

## 2025-07-09 PROBLEM — N95.1 PERIMENOPAUSE: Status: ACTIVE | Noted: 2025-07-09

## 2025-07-09 PROBLEM — R23.2 HOT FLASHES: Status: ACTIVE | Noted: 2025-07-09

## 2025-07-24 ENCOUNTER — APPOINTMENT (OUTPATIENT)
Dept: FAMILY MEDICINE | Facility: CLINIC | Age: 50
End: 2025-07-24
Payer: COMMERCIAL

## 2025-07-24 VITALS
DIASTOLIC BLOOD PRESSURE: 70 MMHG | HEART RATE: 78 BPM | TEMPERATURE: 97.2 F | BODY MASS INDEX: 26.84 KG/M2 | OXYGEN SATURATION: 96 % | WEIGHT: 167 LBS | SYSTOLIC BLOOD PRESSURE: 112 MMHG | HEIGHT: 66 IN

## 2025-07-24 DIAGNOSIS — F17.210 NICOTINE DEPENDENCE, CIGARETTES, UNCOMPLICATED: ICD-10-CM

## 2025-07-24 DIAGNOSIS — J45.40 MODERATE PERSISTENT ASTHMA, UNCOMPLICATED: ICD-10-CM

## 2025-07-24 DIAGNOSIS — B00.9 HERPESVIRAL INFECTION, UNSPECIFIED: ICD-10-CM

## 2025-07-24 DIAGNOSIS — K14.6 GLOSSODYNIA: ICD-10-CM

## 2025-07-24 DIAGNOSIS — R06.02 SHORTNESS OF BREATH: ICD-10-CM

## 2025-07-24 PROCEDURE — 99406 BEHAV CHNG SMOKING 3-10 MIN: CPT

## 2025-07-24 PROCEDURE — 99214 OFFICE O/P EST MOD 30 MIN: CPT | Mod: 25

## 2025-07-24 RX ORDER — NICOTINE 21 MG/24HR
21 PATCH, TRANSDERMAL 24 HOURS TRANSDERMAL DAILY
Qty: 1 | Refills: 2 | Status: ACTIVE | COMMUNITY
Start: 2025-07-24 | End: 1900-01-01

## 2025-07-24 RX ORDER — LIDOCAINE HYDROCHLORIDE 20 MG/ML
2 SOLUTION ORAL; TOPICAL
Qty: 1 | Refills: 1 | Status: ACTIVE | COMMUNITY
Start: 2025-07-24 | End: 1900-01-01

## 2025-07-24 RX ORDER — ALBUTEROL SULFATE 90 UG/1
108 (90 BASE) INHALANT RESPIRATORY (INHALATION)
Qty: 1 | Refills: 1 | Status: ACTIVE | COMMUNITY
Start: 2025-07-24 | End: 1900-01-01

## 2025-08-04 ENCOUNTER — APPOINTMENT (OUTPATIENT)
Dept: CARDIOLOGY | Facility: CLINIC | Age: 50
End: 2025-08-04
Payer: COMMERCIAL

## 2025-08-04 DIAGNOSIS — E78.01 FAMILIAL HYPERCHOLESTEROLEMIA: ICD-10-CM

## 2025-08-04 DIAGNOSIS — E78.5 HYPERLIPIDEMIA, UNSPECIFIED: ICD-10-CM

## 2025-08-04 PROCEDURE — 99214 OFFICE O/P EST MOD 30 MIN: CPT | Mod: 95

## 2025-08-04 PROCEDURE — G2211 COMPLEX E/M VISIT ADD ON: CPT | Mod: NC,95

## 2025-08-05 RX ORDER — ALIROCUMAB 150 MG/ML
150 INJECTION, SOLUTION SUBCUTANEOUS
Qty: 1 | Refills: 5 | Status: ACTIVE | COMMUNITY
Start: 2025-08-04

## 2025-08-06 ENCOUNTER — APPOINTMENT (OUTPATIENT)
Dept: INTERNAL MEDICINE | Facility: CLINIC | Age: 50
End: 2025-08-06

## 2025-08-06 ENCOUNTER — OUTPATIENT (OUTPATIENT)
Dept: OUTPATIENT SERVICES | Facility: HOSPITAL | Age: 50
LOS: 1 days | End: 2025-08-06

## 2025-08-13 ENCOUNTER — APPOINTMENT (OUTPATIENT)
Dept: MAMMOGRAPHY | Facility: CLINIC | Age: 50
End: 2025-08-13

## 2025-08-13 ENCOUNTER — APPOINTMENT (OUTPATIENT)
Dept: ULTRASOUND IMAGING | Facility: CLINIC | Age: 50
End: 2025-08-13

## 2025-09-16 ENCOUNTER — RX RENEWAL (OUTPATIENT)
Age: 50
End: 2025-09-16